# Patient Record
Sex: MALE | Race: WHITE | Employment: FULL TIME | URBAN - METROPOLITAN AREA
[De-identification: names, ages, dates, MRNs, and addresses within clinical notes are randomized per-mention and may not be internally consistent; named-entity substitution may affect disease eponyms.]

---

## 2021-01-23 ENCOUNTER — HOSPITAL ENCOUNTER (EMERGENCY)
Age: 56
Discharge: HOME | End: 2021-01-23
Payer: COMMERCIAL

## 2021-01-23 VITALS
OXYGEN SATURATION: 97 % | DIASTOLIC BLOOD PRESSURE: 83 MMHG | TEMPERATURE: 97.8 F | HEART RATE: 83 BPM | SYSTOLIC BLOOD PRESSURE: 171 MMHG | RESPIRATION RATE: 14 BRPM

## 2021-01-23 DIAGNOSIS — S61.412A LACERATION OF LEFT PALM, INITIAL ENCOUNTER: Primary | ICD-10-CM

## 2021-01-23 PROCEDURE — 99283 EMERGENCY DEPT VISIT LOW MDM: CPT

## 2021-01-23 PROCEDURE — 12001 RPR S/N/AX/GEN/TRNK 2.5CM/<: CPT

## 2021-01-23 PROCEDURE — 90715 TDAP VACCINE 7 YRS/> IM: CPT | Performed by: PHYSICIAN ASSISTANT

## 2021-01-23 PROCEDURE — 90471 IMMUNIZATION ADMIN: CPT | Performed by: PHYSICIAN ASSISTANT

## 2021-01-23 PROCEDURE — 6360000002 HC RX W HCPCS: Performed by: PHYSICIAN ASSISTANT

## 2021-01-23 RX ADMIN — TETANUS TOXOID, REDUCED DIPHTHERIA TOXOID AND ACELLULAR PERTUSSIS VACCINE, ADSORBED 0.5 ML: 5; 2.5; 8; 8; 2.5 SUSPENSION INTRAMUSCULAR at 15:01

## 2021-01-23 ASSESSMENT — PAIN DESCRIPTION - FREQUENCY: FREQUENCY: CONTINUOUS

## 2021-01-23 ASSESSMENT — PAIN DESCRIPTION - PAIN TYPE: TYPE: ACUTE PAIN

## 2021-01-23 ASSESSMENT — PAIN SCALES - GENERAL: PAINLEVEL_OUTOF10: 2

## 2022-06-06 ENCOUNTER — HOSPITAL ENCOUNTER (OUTPATIENT)
Age: 57
Discharge: HOME | End: 2022-06-08
Payer: OTHER GOVERNMENT

## 2022-06-06 ENCOUNTER — HOSPITAL ENCOUNTER (OUTPATIENT)
Dept: GENERAL RADIOLOGY | Age: 57
Discharge: HOME | End: 2022-06-08
Payer: OTHER GOVERNMENT

## 2022-06-06 DIAGNOSIS — M19.90 ARTHRITIS: ICD-10-CM

## 2022-06-06 PROCEDURE — 73562 X-RAY EXAM OF KNEE 3: CPT

## 2022-06-06 PROCEDURE — 73501 X-RAY EXAM HIP UNI 1 VIEW: CPT

## 2022-06-06 PROCEDURE — 73030 X-RAY EXAM OF SHOULDER: CPT

## 2022-06-06 PROCEDURE — 73620 X-RAY EXAM OF FOOT: CPT

## 2022-06-06 PROCEDURE — 72050 X-RAY EXAM NECK SPINE 4/5VWS: CPT

## 2023-11-27 ENCOUNTER — APPOINTMENT (OUTPATIENT)
Dept: GENERAL RADIOLOGY | Age: 58
DRG: 516 | End: 2023-11-27
Payer: COMMERCIAL

## 2023-11-27 ENCOUNTER — APPOINTMENT (OUTPATIENT)
Dept: CT IMAGING | Age: 58
DRG: 516 | End: 2023-11-27
Payer: COMMERCIAL

## 2023-11-27 ENCOUNTER — HOSPITAL ENCOUNTER (INPATIENT)
Age: 58
LOS: 3 days | Discharge: HOME HEALTH | DRG: 516 | End: 2023-11-30
Attending: EMERGENCY MEDICINE | Admitting: SURGERY
Payer: COMMERCIAL

## 2023-11-27 ENCOUNTER — ANESTHESIA EVENT (OUTPATIENT)
Dept: OPERATING ROOM | Age: 58
DRG: 516 | End: 2023-11-27
Payer: COMMERCIAL

## 2023-11-27 DIAGNOSIS — S72.001A CLOSED FRACTURE DISLOCATION OF RIGHT HIP JOINT, INITIAL ENCOUNTER (HCC): Primary | ICD-10-CM

## 2023-11-27 DIAGNOSIS — S32.401A CLOSED DISPLACED FRACTURE OF RIGHT ACETABULUM, UNSPECIFIED PORTION OF ACETABULUM, INITIAL ENCOUNTER (HCC): ICD-10-CM

## 2023-11-27 PROBLEM — T14.90XA TRAUMA: Status: ACTIVE | Noted: 2023-11-27

## 2023-11-27 LAB
ALBUMIN SERPL-MCNC: 4.1 G/DL (ref 3.5–5.2)
ALBUMIN SERPL-MCNC: 4.2 G/DL (ref 3.5–5.2)
ALP SERPL-CCNC: 59 U/L (ref 40–129)
ALP SERPL-CCNC: 66 U/L (ref 40–129)
ALT SERPL-CCNC: 76 U/L (ref 0–40)
ALT SERPL-CCNC: 91 U/L (ref 0–40)
AMPHET UR QL SCN: NEGATIVE
ANION GAP SERPL CALCULATED.3IONS-SCNC: 18 MMOL/L (ref 7–16)
ANION GAP SERPL CALCULATED.3IONS-SCNC: 21 MMOL/L (ref 7–16)
AST SERPL-CCNC: 74 U/L (ref 0–39)
AST SERPL-CCNC: 95 U/L (ref 0–39)
BARBITURATES UR QL SCN: NEGATIVE
BASOPHILS # BLD: 0.07 K/UL (ref 0–0.2)
BASOPHILS NFR BLD: 1 % (ref 0–2)
BENZODIAZ UR QL: POSITIVE
BILIRUB SERPL-MCNC: 0.6 MG/DL (ref 0–1.2)
BILIRUB SERPL-MCNC: 1 MG/DL (ref 0–1.2)
BUN SERPL-MCNC: 21 MG/DL (ref 6–20)
BUN SERPL-MCNC: 24 MG/DL (ref 6–20)
BUPRENORPHINE UR QL: NEGATIVE
CALCIUM SERPL-MCNC: 8.4 MG/DL (ref 8.6–10.2)
CALCIUM SERPL-MCNC: 9.3 MG/DL (ref 8.6–10.2)
CANNABINOIDS UR QL SCN: NEGATIVE
CHLORIDE SERPL-SCNC: 104 MMOL/L (ref 98–107)
CHLORIDE SERPL-SCNC: 99 MMOL/L (ref 98–107)
CO2 SERPL-SCNC: 16 MMOL/L (ref 22–29)
CO2 SERPL-SCNC: 16 MMOL/L (ref 22–29)
COCAINE UR QL SCN: NEGATIVE
CREAT SERPL-MCNC: 0.7 MG/DL (ref 0.7–1.2)
CREAT SERPL-MCNC: 0.8 MG/DL (ref 0.7–1.2)
EOSINOPHIL # BLD: 0 K/UL (ref 0.05–0.5)
EOSINOPHILS RELATIVE PERCENT: 0 % (ref 0–6)
ERYTHROCYTE [DISTWIDTH] IN BLOOD BY AUTOMATED COUNT: 13.2 % (ref 11.5–15)
ERYTHROCYTE [DISTWIDTH] IN BLOOD BY AUTOMATED COUNT: 13.6 % (ref 11.5–15)
FENTANYL UR QL: POSITIVE
GFR SERPL CREATININE-BSD FRML MDRD: >60 ML/MIN/1.73M2
GFR SERPL CREATININE-BSD FRML MDRD: >60 ML/MIN/1.73M2
GLUCOSE BLD-MCNC: 271 MG/DL (ref 74–99)
GLUCOSE SERPL-MCNC: 268 MG/DL (ref 74–99)
GLUCOSE SERPL-MCNC: 378 MG/DL (ref 74–99)
HBA1C MFR BLD: 8.5 % (ref 4–5.6)
HCT VFR BLD AUTO: 40.1 % (ref 37–54)
HCT VFR BLD AUTO: 44.9 % (ref 37–54)
HGB BLD-MCNC: 13.8 G/DL (ref 12.5–16.5)
HGB BLD-MCNC: 15.1 G/DL (ref 12.5–16.5)
INR PPP: 1.1
LACTATE BLDV-SCNC: 2.2 MMOL/L (ref 0.5–2.2)
LYMPHOCYTES NFR BLD: 0.07 K/UL (ref 1.5–4)
LYMPHOCYTES RELATIVE PERCENT: 1 % (ref 20–42)
MCH RBC QN AUTO: 29.7 PG (ref 26–35)
MCH RBC QN AUTO: 30.6 PG (ref 26–35)
MCHC RBC AUTO-ENTMCNC: 33.6 G/DL (ref 32–34.5)
MCHC RBC AUTO-ENTMCNC: 34.4 G/DL (ref 32–34.5)
MCV RBC AUTO: 88.4 FL (ref 80–99.9)
MCV RBC AUTO: 88.9 FL (ref 80–99.9)
METHADONE UR QL: NEGATIVE
MONOCYTES NFR BLD: 0.55 K/UL (ref 0.1–0.95)
MONOCYTES NFR BLD: 7 % (ref 2–12)
NEUTROPHILS NFR BLD: 91 % (ref 43–80)
NEUTS SEG NFR BLD: 7.02 K/UL (ref 1.8–7.3)
OPIATES UR QL SCN: NEGATIVE
OXYCODONE UR QL SCN: NEGATIVE
PARTIAL THROMBOPLASTIN TIME: 26.2 SEC (ref 24.5–35.1)
PCP UR QL SCN: NEGATIVE
PLATELET # BLD AUTO: 117 K/UL (ref 130–450)
PLATELET, FLUORESCENCE: 100 K/UL (ref 130–450)
PMV BLD AUTO: 12.9 FL (ref 7–12)
PMV BLD AUTO: 13 FL (ref 7–12)
POTASSIUM SERPL-SCNC: 4.6 MMOL/L (ref 3.5–5)
POTASSIUM SERPL-SCNC: 5.1 MMOL/L (ref 3.5–5)
PROT SERPL-MCNC: 6.4 G/DL (ref 6.4–8.3)
PROT SERPL-MCNC: 6.9 G/DL (ref 6.4–8.3)
PROTHROMBIN TIME: 12.3 SEC (ref 9.3–12.4)
RBC # BLD AUTO: 4.51 M/UL (ref 3.8–5.8)
RBC # BLD AUTO: 5.08 M/UL (ref 3.8–5.8)
RBC # BLD: ABNORMAL 10*6/UL
RBC # BLD: ABNORMAL 10*6/UL
SODIUM SERPL-SCNC: 136 MMOL/L (ref 132–146)
SODIUM SERPL-SCNC: 138 MMOL/L (ref 132–146)
TEST INFORMATION: ABNORMAL
WBC OTHER # BLD: 7.7 K/UL (ref 4.5–11.5)
WBC OTHER # BLD: 7.8 K/UL (ref 4.5–11.5)

## 2023-11-27 PROCEDURE — 83605 ASSAY OF LACTIC ACID: CPT

## 2023-11-27 PROCEDURE — 73502 X-RAY EXAM HIP UNI 2-3 VIEWS: CPT

## 2023-11-27 PROCEDURE — 6370000000 HC RX 637 (ALT 250 FOR IP)

## 2023-11-27 PROCEDURE — 73560 X-RAY EXAM OF KNEE 1 OR 2: CPT

## 2023-11-27 PROCEDURE — 99285 EMERGENCY DEPT VISIT HI MDM: CPT

## 2023-11-27 PROCEDURE — 6360000002 HC RX W HCPCS: Performed by: EMERGENCY MEDICINE

## 2023-11-27 PROCEDURE — 85027 COMPLETE CBC AUTOMATED: CPT

## 2023-11-27 PROCEDURE — 83036 HEMOGLOBIN GLYCOSYLATED A1C: CPT

## 2023-11-27 PROCEDURE — 2W5LX0Z REMOVAL OF TRACTION APPARATUS ON RIGHT LOWER EXTREMITY: ICD-10-PCS | Performed by: SURGERY

## 2023-11-27 PROCEDURE — 74177 CT ABD & PELVIS W/CONTRAST: CPT

## 2023-11-27 PROCEDURE — 80053 COMPREHEN METABOLIC PANEL: CPT

## 2023-11-27 PROCEDURE — 51702 INSERT TEMP BLADDER CATH: CPT

## 2023-11-27 PROCEDURE — 86900 BLOOD TYPING SEROLOGIC ABO: CPT

## 2023-11-27 PROCEDURE — 2580000003 HC RX 258: Performed by: EMERGENCY MEDICINE

## 2023-11-27 PROCEDURE — 96375 TX/PRO/DX INJ NEW DRUG ADDON: CPT

## 2023-11-27 PROCEDURE — 73610 X-RAY EXAM OF ANKLE: CPT

## 2023-11-27 PROCEDURE — 72190 X-RAY EXAM OF PELVIS: CPT

## 2023-11-27 PROCEDURE — 86923 COMPATIBILITY TEST ELECTRIC: CPT

## 2023-11-27 PROCEDURE — 96374 THER/PROPH/DIAG INJ IV PUSH: CPT

## 2023-11-27 PROCEDURE — 93005 ELECTROCARDIOGRAM TRACING: CPT

## 2023-11-27 PROCEDURE — 99221 1ST HOSP IP/OBS SF/LOW 40: CPT | Performed by: ORTHOPAEDIC SURGERY

## 2023-11-27 PROCEDURE — 6360000004 HC RX CONTRAST MEDICATION: Performed by: RADIOLOGY

## 2023-11-27 PROCEDURE — 1200000000 HC SEMI PRIVATE

## 2023-11-27 PROCEDURE — 85730 THROMBOPLASTIN TIME PARTIAL: CPT

## 2023-11-27 PROCEDURE — 71045 X-RAY EXAM CHEST 1 VIEW: CPT

## 2023-11-27 PROCEDURE — 0QH434Z INSERTION OF INTERNAL FIXATION DEVICE INTO RIGHT ACETABULUM, PERCUTANEOUS APPROACH: ICD-10-PCS | Performed by: SURGERY

## 2023-11-27 PROCEDURE — 6360000002 HC RX W HCPCS

## 2023-11-27 PROCEDURE — 86901 BLOOD TYPING SEROLOGIC RH(D): CPT

## 2023-11-27 PROCEDURE — 82962 GLUCOSE BLOOD TEST: CPT

## 2023-11-27 PROCEDURE — 85610 PROTHROMBIN TIME: CPT

## 2023-11-27 PROCEDURE — 71260 CT THORAX DX C+: CPT

## 2023-11-27 PROCEDURE — 70450 CT HEAD/BRAIN W/O DYE: CPT

## 2023-11-27 PROCEDURE — 85025 COMPLETE CBC W/AUTO DIFF WBC: CPT

## 2023-11-27 PROCEDURE — 72125 CT NECK SPINE W/O DYE: CPT

## 2023-11-27 PROCEDURE — 86850 RBC ANTIBODY SCREEN: CPT

## 2023-11-27 PROCEDURE — 76376 3D RENDER W/INTRP POSTPROCES: CPT

## 2023-11-27 PROCEDURE — 80307 DRUG TEST PRSMV CHEM ANLYZR: CPT

## 2023-11-27 RX ORDER — MIDAZOLAM HYDROCHLORIDE 2 MG/2ML
1 INJECTION, SOLUTION INTRAMUSCULAR; INTRAVENOUS ONCE
Status: COMPLETED | OUTPATIENT
Start: 2023-11-27 | End: 2023-11-27

## 2023-11-27 RX ORDER — FENTANYL CITRATE 50 UG/ML
50 INJECTION, SOLUTION INTRAMUSCULAR; INTRAVENOUS ONCE
Status: COMPLETED | OUTPATIENT
Start: 2023-11-27 | End: 2023-11-27

## 2023-11-27 RX ORDER — POLYETHYLENE GLYCOL 3350 17 G/17G
17 POWDER, FOR SOLUTION ORAL DAILY
Status: DISCONTINUED | OUTPATIENT
Start: 2023-11-27 | End: 2023-11-30 | Stop reason: HOSPADM

## 2023-11-27 RX ORDER — ONDANSETRON 2 MG/ML
4 INJECTION INTRAMUSCULAR; INTRAVENOUS EVERY 6 HOURS PRN
Status: DISCONTINUED | OUTPATIENT
Start: 2023-11-27 | End: 2023-11-30 | Stop reason: HOSPADM

## 2023-11-27 RX ORDER — TIRZEPATIDE 10 MG/.5ML
10 INJECTION, SOLUTION SUBCUTANEOUS WEEKLY
COMMUNITY

## 2023-11-27 RX ORDER — ATORVASTATIN CALCIUM 10 MG/1
10 TABLET, FILM COATED ORAL DAILY
Status: DISCONTINUED | OUTPATIENT
Start: 2023-11-27 | End: 2023-11-27

## 2023-11-27 RX ORDER — INSULIN LISPRO 100 [IU]/ML
0-4 INJECTION, SOLUTION INTRAVENOUS; SUBCUTANEOUS NIGHTLY
Status: DISCONTINUED | OUTPATIENT
Start: 2023-11-27 | End: 2023-11-29

## 2023-11-27 RX ORDER — SODIUM CHLORIDE 9 MG/ML
INJECTION, SOLUTION INTRAVENOUS PRN
Status: DISCONTINUED | OUTPATIENT
Start: 2023-11-27 | End: 2023-11-30 | Stop reason: HOSPADM

## 2023-11-27 RX ORDER — INSULIN GLARGINE 100 [IU]/ML
72 INJECTION, SOLUTION SUBCUTANEOUS 2 TIMES DAILY
Status: DISCONTINUED | OUTPATIENT
Start: 2023-11-27 | End: 2023-11-29

## 2023-11-27 RX ORDER — OXYCODONE HYDROCHLORIDE 5 MG/1
5 TABLET ORAL EVERY 4 HOURS PRN
Status: DISCONTINUED | OUTPATIENT
Start: 2023-11-27 | End: 2023-11-30 | Stop reason: HOSPADM

## 2023-11-27 RX ORDER — INSULIN LISPRO 100 [IU]/ML
40 INJECTION, SOLUTION INTRAVENOUS; SUBCUTANEOUS 2 TIMES DAILY
Status: DISCONTINUED | OUTPATIENT
Start: 2023-11-27 | End: 2023-11-29

## 2023-11-27 RX ORDER — METHOCARBAMOL 500 MG/1
1000 TABLET, FILM COATED ORAL 4 TIMES DAILY
Status: DISCONTINUED | OUTPATIENT
Start: 2023-11-27 | End: 2023-11-30 | Stop reason: HOSPADM

## 2023-11-27 RX ORDER — ONDANSETRON 4 MG/1
4 TABLET, ORALLY DISINTEGRATING ORAL EVERY 8 HOURS PRN
Status: DISCONTINUED | OUTPATIENT
Start: 2023-11-27 | End: 2023-11-30 | Stop reason: HOSPADM

## 2023-11-27 RX ORDER — SODIUM CHLORIDE 0.9 % (FLUSH) 0.9 %
10 SYRINGE (ML) INJECTION EVERY 12 HOURS SCHEDULED
Status: DISCONTINUED | OUTPATIENT
Start: 2023-11-27 | End: 2023-11-30 | Stop reason: HOSPADM

## 2023-11-27 RX ORDER — CANAGLIFLOZIN 300 MG/1
300 TABLET, FILM COATED ORAL
COMMUNITY

## 2023-11-27 RX ORDER — ATORVASTATIN CALCIUM 10 MG/1
10 TABLET, FILM COATED ORAL NIGHTLY
Status: DISCONTINUED | OUTPATIENT
Start: 2023-11-28 | End: 2023-11-30 | Stop reason: HOSPADM

## 2023-11-27 RX ORDER — ACETAMINOPHEN 325 MG/1
650 TABLET ORAL EVERY 4 HOURS PRN
Status: DISCONTINUED | OUTPATIENT
Start: 2023-11-27 | End: 2023-11-30 | Stop reason: HOSPADM

## 2023-11-27 RX ORDER — DEXTROSE MONOHYDRATE 100 MG/ML
INJECTION, SOLUTION INTRAVENOUS CONTINUOUS PRN
Status: DISCONTINUED | OUTPATIENT
Start: 2023-11-27 | End: 2023-11-30 | Stop reason: HOSPADM

## 2023-11-27 RX ORDER — 0.9 % SODIUM CHLORIDE 0.9 %
1000 INTRAVENOUS SOLUTION INTRAVENOUS ONCE
Status: COMPLETED | OUTPATIENT
Start: 2023-11-27 | End: 2023-11-27

## 2023-11-27 RX ORDER — SODIUM CHLORIDE 0.9 % (FLUSH) 0.9 %
10 SYRINGE (ML) INJECTION PRN
Status: DISCONTINUED | OUTPATIENT
Start: 2023-11-27 | End: 2023-11-30 | Stop reason: HOSPADM

## 2023-11-27 RX ORDER — INSULIN LISPRO 100 [IU]/ML
0-4 INJECTION, SOLUTION INTRAVENOUS; SUBCUTANEOUS
Status: DISCONTINUED | OUTPATIENT
Start: 2023-11-27 | End: 2023-11-29

## 2023-11-27 RX ORDER — SENNA AND DOCUSATE SODIUM 50; 8.6 MG/1; MG/1
1 TABLET, FILM COATED ORAL 2 TIMES DAILY
Status: DISCONTINUED | OUTPATIENT
Start: 2023-11-27 | End: 2023-11-30 | Stop reason: HOSPADM

## 2023-11-27 RX ORDER — SODIUM CHLORIDE 9 MG/ML
INJECTION, SOLUTION INTRAVENOUS CONTINUOUS
Status: DISCONTINUED | OUTPATIENT
Start: 2023-11-27 | End: 2023-11-29

## 2023-11-27 RX ORDER — OXYCODONE HYDROCHLORIDE 10 MG/1
10 TABLET ORAL EVERY 4 HOURS PRN
Status: DISCONTINUED | OUTPATIENT
Start: 2023-11-27 | End: 2023-11-30 | Stop reason: HOSPADM

## 2023-11-27 RX ORDER — SODIUM CHLORIDE 0.9 % (FLUSH) 0.9 %
10 SYRINGE (ML) INJECTION
Status: ACTIVE | OUTPATIENT
Start: 2023-11-27 | End: 2023-11-28

## 2023-11-27 RX ADMIN — MIDAZOLAM 1 MG: 1 INJECTION INTRAMUSCULAR; INTRAVENOUS at 12:12

## 2023-11-27 RX ADMIN — SENNOSIDES AND DOCUSATE SODIUM 1 TABLET: 8.6; 5 TABLET ORAL at 21:47

## 2023-11-27 RX ADMIN — FENTANYL CITRATE 50 MCG: 50 INJECTION INTRAMUSCULAR; INTRAVENOUS at 15:23

## 2023-11-27 RX ADMIN — OXYCODONE HYDROCHLORIDE 10 MG: 10 TABLET ORAL at 20:09

## 2023-11-27 RX ADMIN — IOPAMIDOL 75 ML: 755 INJECTION, SOLUTION INTRAVENOUS at 10:33

## 2023-11-27 RX ADMIN — HYDROMORPHONE HYDROCHLORIDE 0.5 MG: 1 INJECTION, SOLUTION INTRAMUSCULAR; INTRAVENOUS; SUBCUTANEOUS at 21:46

## 2023-11-27 RX ADMIN — SODIUM CHLORIDE 1000 ML: 9 INJECTION, SOLUTION INTRAVENOUS at 11:54

## 2023-11-27 RX ADMIN — FENTANYL CITRATE 50 MCG: 50 INJECTION INTRAMUSCULAR; INTRAVENOUS at 12:15

## 2023-11-27 RX ADMIN — SODIUM CHLORIDE: 9 INJECTION, SOLUTION INTRAVENOUS at 11:55

## 2023-11-27 RX ADMIN — METHOCARBAMOL 1000 MG: 500 TABLET ORAL at 20:09

## 2023-11-27 RX ADMIN — INSULIN GLARGINE 72 UNITS: 100 INJECTION, SOLUTION SUBCUTANEOUS at 20:16

## 2023-11-27 ASSESSMENT — PAIN SCALES - GENERAL
PAINLEVEL_OUTOF10: 8
PAINLEVEL_OUTOF10: 5
PAINLEVEL_OUTOF10: 8
PAINLEVEL_OUTOF10: 3
PAINLEVEL_OUTOF10: 7
PAINLEVEL_OUTOF10: 5

## 2023-11-27 ASSESSMENT — PAIN DESCRIPTION - LOCATION
LOCATION: HIP

## 2023-11-27 ASSESSMENT — PAIN - FUNCTIONAL ASSESSMENT
PAIN_FUNCTIONAL_ASSESSMENT: PREVENTS OR INTERFERES WITH ALL ACTIVE AND SOME PASSIVE ACTIVITIES
PAIN_FUNCTIONAL_ASSESSMENT: 0-10

## 2023-11-27 ASSESSMENT — PAIN DESCRIPTION - DESCRIPTORS
DESCRIPTORS: ACHING;STABBING
DESCRIPTORS: STABBING

## 2023-11-27 ASSESSMENT — PAIN DESCRIPTION - ONSET: ONSET: SUDDEN

## 2023-11-27 ASSESSMENT — PAIN DESCRIPTION - ORIENTATION
ORIENTATION: RIGHT

## 2023-11-27 ASSESSMENT — LIFESTYLE VARIABLES
HOW OFTEN DO YOU HAVE A DRINK CONTAINING ALCOHOL: MONTHLY OR LESS
HOW MANY STANDARD DRINKS CONTAINING ALCOHOL DO YOU HAVE ON A TYPICAL DAY: 1 OR 2

## 2023-11-27 ASSESSMENT — PAIN DESCRIPTION - FREQUENCY: FREQUENCY: CONTINUOUS

## 2023-11-27 NOTE — PROGRESS NOTES
X-ray of the right ankle demonstrate no obvious acute fracture dislocation.   Patient does have degenerative changes throughout the ankle

## 2023-11-27 NOTE — ED NOTES
Name: Nini Roman  : 1965  MRN: [id number]    Date: 2023    Benefits of immediately proceeding with Radiology exam outweigh the risks and therefore the following is being waived:      [] Pregnancy test    [] Protocol for Iodine allergy    [] MRI questionnaire    [x] BUN/Creatinine        MD Adrian Joseph MD  23 1011

## 2023-11-27 NOTE — PROGRESS NOTES
Progress note    Spoke with Dr. Kelby Rascon     Patient will undergo open reduction internal fixation of the right pelvis 11-28.   Patient okay for diet from orthopedic surgery standpoint today n.p.o. midnight

## 2023-11-27 NOTE — H&P
TRAUMA HISTORY & PHYSICAL  Attending/Surgical Resident/Advance Practice Nurse  11/27/2023  4:12 PM    PRIMARY SURVEY    CHIEF COMPLAINT:  Trauma consult. Injury occurred just prior to arrival.  Patient was on a tree stand and fell 10 feet. Patient fell on his right hip. He denies hitting his head. Denies any hitting his back. He denies any loss of consciousness. He denies any current back pain, numbness or tingling. Patient brought to the ED and underwent CT head, CT cervical, CT abdomen pelvis and CT chest.  This did not show any injuries other than a right acetabular fracture and a right femur fracture with subluxation. AIRWAY:   Airway Normal    EMS ETT Absent  Noisy respirations Absent  Retractions: Absent  Vomiting/bleeding: Absent    BREATHING:    Midaxillary breath sound left:  Normal  Midaxillary breath sound right:  Normal    Cough sound intensity:  good    .      CIRCULATION:   Femerol pulse intensity: Strong  Palpebral conjunctiva: white    Vitals:    11/27/23 1504   BP: 120/84   Pulse: 88   Resp: 15   Temp:    SpO2: 98%       Vitals:    11/27/23 1217 11/27/23 1335 11/27/23 1412 11/27/23 1504   BP: 137/88 120/85 124/82 120/84   Pulse: 76 88 89 88   Resp: 20 18 20 15   Temp:       SpO2: 98% 98% 97% 98%        FAST EXAM: Deferred    Central Nervous System    GCS Initial 15 minutes   Eye  Motor  Verbal 4 - Opens eyes on own  6 - Follows simple motor commands  5 - Alert and oriented 4 - Opens eyes on own  6 - Follows simple motor commands  5 - Alert and oriented     Neuromuscular blockade: No  Pupil size:  Left 2 mm    Right 2 mm  Pupil reaction: No    Wiggles fingers: Left Yes Right Yes  Wiggles toes: Left Yes   Right Yes    Hand grasp:   Left  Present      Right  Present    Loss of consciousness:  No     History Obtained From:  Patient & EMS  Private Medical Doctor: KEISHA Lott - KELSEY      Pre-exisiting Medical History:  yes    Conditions: DMII    Medications: metforming, lantus, 11/27       Plan of Treatment:  Tert   Ortho consulted - traction pin in place, planning definitive management 11/29   Admit to med surg   Will consult endo for DM management   Ok to restart liptior   Ok for regular diet   NPO on 11/29   Start DVT prophalyxis 11/28   PT/OT  DC planning     Plan discussed with Dr. Kali Medina at 11/27/2023 on 4:12 PM    Electronically signed by Gerardo Ba DO on 11/27/2023 at 4:12 PM\      Attending Attestation   I saw and examined the patient, I agree with resident note      Hx taken from patient    I personally reviewed the  CT imaging, he has an acute R sided acetabular fx dislocation.      - s/p fall from tree stand with acute closed Acetabular fx     I am managing prescription drugs, IV dilaudid, home meds,     Neri Be MD FACS

## 2023-11-28 ENCOUNTER — APPOINTMENT (OUTPATIENT)
Dept: GENERAL RADIOLOGY | Age: 58
DRG: 516 | End: 2023-11-28
Payer: COMMERCIAL

## 2023-11-28 ENCOUNTER — ANESTHESIA (OUTPATIENT)
Dept: OPERATING ROOM | Age: 58
DRG: 516 | End: 2023-11-28
Payer: COMMERCIAL

## 2023-11-28 ENCOUNTER — APPOINTMENT (OUTPATIENT)
Dept: CT IMAGING | Age: 58
DRG: 516 | End: 2023-11-28
Payer: COMMERCIAL

## 2023-11-28 LAB
ANION GAP SERPL CALCULATED.3IONS-SCNC: 18 MMOL/L (ref 7–16)
BASOPHILS # BLD: 0.01 K/UL (ref 0–0.2)
BASOPHILS NFR BLD: 0 % (ref 0–2)
BUN BLD-MCNC: 20 MG/DL (ref 6–20)
BUN SERPL-MCNC: 19 MG/DL (ref 6–20)
CA-I BLD-SCNC: 1.14 MMOL/L (ref 1.15–1.33)
CALCIUM SERPL-MCNC: 8.1 MG/DL (ref 8.6–10.2)
CHLORIDE BLD-SCNC: 104 MMOL/L (ref 100–108)
CHLORIDE SERPL-SCNC: 104 MMOL/L (ref 98–107)
CO2 BLD CALC-SCNC: 21 MMOL/L (ref 22–29)
CO2 SERPL-SCNC: 17 MMOL/L (ref 22–29)
CREAT BLD-MCNC: 0.7 MG/DL (ref 0.7–1.2)
CREAT SERPL-MCNC: 0.7 MG/DL (ref 0.7–1.2)
EGFR, POC: >60 ML/MIN/1.73M2
EKG ATRIAL RATE: 93 BPM
EKG P AXIS: 30 DEGREES
EKG P-R INTERVAL: 158 MS
EKG Q-T INTERVAL: 392 MS
EKG QRS DURATION: 72 MS
EKG QTC CALCULATION (BAZETT): 487 MS
EKG R AXIS: 0 DEGREES
EKG T AXIS: 64 DEGREES
EKG VENTRICULAR RATE: 93 BPM
EOSINOPHIL # BLD: 0.03 K/UL (ref 0.05–0.5)
EOSINOPHILS RELATIVE PERCENT: 1 % (ref 0–6)
ERYTHROCYTE [DISTWIDTH] IN BLOOD BY AUTOMATED COUNT: 13.8 % (ref 11.5–15)
ERYTHROCYTE [DISTWIDTH] IN BLOOD BY AUTOMATED COUNT: 13.8 % (ref 11.5–15)
GFR SERPL CREATININE-BSD FRML MDRD: >60 ML/MIN/1.73M2
GLUCOSE BLD-MCNC: 196 MG/DL (ref 74–99)
GLUCOSE BLD-MCNC: 241 MG/DL (ref 74–99)
GLUCOSE BLD-MCNC: 266 MG/DL (ref 74–99)
GLUCOSE BLD-MCNC: 271 MG/DL (ref 74–99)
GLUCOSE SERPL-MCNC: 226 MG/DL (ref 74–99)
HCO3 VENOUS: 21.2 MMOL/L (ref 22–26)
HCT VFR BLD AUTO: 32 % (ref 37–54)
HCT VFR BLD AUTO: 34.2 % (ref 37–54)
HCT VFR BLD AUTO: 34.5 % (ref 37–54)
HCT VFR BLD AUTO: 37 % (ref 37–54)
HGB BLD-MCNC: 10.8 G/DL (ref 12.5–16.5)
HGB BLD-MCNC: 11.1 G/DL (ref 12.5–16.5)
HGB BLD-MCNC: 12.1 G/DL (ref 12.5–16.5)
IMM GRANULOCYTES # BLD AUTO: 0.03 K/UL (ref 0–0.58)
IMM GRANULOCYTES NFR BLD: 1 % (ref 0–5)
INR PPP: 1.2
LYMPHOCYTES NFR BLD: 0.75 K/UL (ref 1.5–4)
LYMPHOCYTES RELATIVE PERCENT: 11 % (ref 20–42)
MCH RBC QN AUTO: 29.7 PG (ref 26–35)
MCH RBC QN AUTO: 29.8 PG (ref 26–35)
MCHC RBC AUTO-ENTMCNC: 32.5 G/DL (ref 32–34.5)
MCHC RBC AUTO-ENTMCNC: 32.7 G/DL (ref 32–34.5)
MCV RBC AUTO: 90.7 FL (ref 80–99.9)
MCV RBC AUTO: 91.7 FL (ref 80–99.9)
MONOCYTES NFR BLD: 0.94 K/UL (ref 0.1–0.95)
MONOCYTES NFR BLD: 14 % (ref 2–12)
NEGATIVE BASE EXCESS, VEN: 5.2 MMOL/L
NEUTROPHILS NFR BLD: 73 % (ref 43–80)
NEUTS SEG NFR BLD: 4.87 K/UL (ref 1.8–7.3)
O2 SAT, VEN: 86.2 %
PARTIAL THROMBOPLASTIN TIME: 27.3 SEC (ref 24.5–35.1)
PCO2, VEN: 43.8 MM HG
PH VENOUS: 7.29 (ref 7.35–7.45)
PLATELET # BLD AUTO: 108 K/UL (ref 130–450)
PLATELET, FLUORESCENCE: 100 K/UL (ref 130–450)
PMV BLD AUTO: 12.9 FL (ref 7–12)
PMV BLD AUTO: 13 FL (ref 7–12)
PO2, VEN: 57.6 MM HG
POC ANION GAP: 13 MMOL/L (ref 7–16)
POC HEMOGLOBIN (CALC): 10.8 G/DL (ref 12.5–15.5)
POC LACTIC ACID: 1.3 MMOL/L (ref 0.5–2.2)
POTASSIUM BLD-SCNC: 4.2 MMOL/L (ref 3.5–5)
POTASSIUM SERPL-SCNC: 4.3 MMOL/L (ref 3.5–5)
PROTHROMBIN TIME: 12.6 SEC (ref 9.3–12.4)
RBC # BLD AUTO: 3.73 M/UL (ref 3.8–5.8)
RBC # BLD AUTO: 4.08 M/UL (ref 3.8–5.8)
SODIUM BLD-SCNC: 138 MMOL/L (ref 132–146)
SODIUM SERPL-SCNC: 139 MMOL/L (ref 132–146)
WBC OTHER # BLD: 6.5 K/UL (ref 4.5–11.5)
WBC OTHER # BLD: 6.6 K/UL (ref 4.5–11.5)

## 2023-11-28 PROCEDURE — 85014 HEMATOCRIT: CPT

## 2023-11-28 PROCEDURE — 99223 1ST HOSP IP/OBS HIGH 75: CPT | Performed by: SURGERY

## 2023-11-28 PROCEDURE — C1713 ANCHOR/SCREW BN/BN,TIS/BN: HCPCS | Performed by: ORTHOPAEDIC SURGERY

## 2023-11-28 PROCEDURE — 27228 TREAT HIP FRACTURE(S): CPT | Performed by: ORTHOPAEDIC SURGERY

## 2023-11-28 PROCEDURE — 85027 COMPLETE CBC AUTOMATED: CPT

## 2023-11-28 PROCEDURE — 3700000000 HC ANESTHESIA ATTENDED CARE: Performed by: ORTHOPAEDIC SURGERY

## 2023-11-28 PROCEDURE — 6370000000 HC RX 637 (ALT 250 FOR IP): Performed by: ORTHOPAEDIC SURGERY

## 2023-11-28 PROCEDURE — 85610 PROTHROMBIN TIME: CPT

## 2023-11-28 PROCEDURE — 72192 CT PELVIS W/O DYE: CPT

## 2023-11-28 PROCEDURE — 1200000000 HC SEMI PRIVATE

## 2023-11-28 PROCEDURE — 82962 GLUCOSE BLOOD TEST: CPT

## 2023-11-28 PROCEDURE — 2500000003 HC RX 250 WO HCPCS: Performed by: ORTHOPAEDIC SURGERY

## 2023-11-28 PROCEDURE — 6360000002 HC RX W HCPCS: Performed by: ORTHOPAEDIC SURGERY

## 2023-11-28 PROCEDURE — 36415 COLL VENOUS BLD VENIPUNCTURE: CPT

## 2023-11-28 PROCEDURE — 6370000000 HC RX 637 (ALT 250 FOR IP)

## 2023-11-28 PROCEDURE — 6360000002 HC RX W HCPCS

## 2023-11-28 PROCEDURE — 93010 ELECTROCARDIOGRAM REPORT: CPT | Performed by: INTERNAL MEDICINE

## 2023-11-28 PROCEDURE — P9073 PLATELETS PHERESIS PATH REDU: HCPCS

## 2023-11-28 PROCEDURE — 2580000003 HC RX 258

## 2023-11-28 PROCEDURE — 3600000015 HC SURGERY LEVEL 5 ADDTL 15MIN: Performed by: ORTHOPAEDIC SURGERY

## 2023-11-28 PROCEDURE — 2500000003 HC RX 250 WO HCPCS

## 2023-11-28 PROCEDURE — 27228 TREAT HIP FRACTURE(S): CPT | Performed by: STUDENT IN AN ORGANIZED HEALTH CARE EDUCATION/TRAINING PROGRAM

## 2023-11-28 PROCEDURE — 7100000000 HC PACU RECOVERY - FIRST 15 MIN: Performed by: ORTHOPAEDIC SURGERY

## 2023-11-28 PROCEDURE — 2580000003 HC RX 258: Performed by: EMERGENCY MEDICINE

## 2023-11-28 PROCEDURE — 2709999900 HC NON-CHARGEABLE SUPPLY: Performed by: ORTHOPAEDIC SURGERY

## 2023-11-28 PROCEDURE — 2500000003 HC RX 250 WO HCPCS: Performed by: STUDENT IN AN ORGANIZED HEALTH CARE EDUCATION/TRAINING PROGRAM

## 2023-11-28 PROCEDURE — 80047 BASIC METABLC PNL IONIZED CA: CPT

## 2023-11-28 PROCEDURE — 6360000002 HC RX W HCPCS: Performed by: NURSE ANESTHETIST, CERTIFIED REGISTERED

## 2023-11-28 PROCEDURE — 3700000001 HC ADD 15 MINUTES (ANESTHESIA): Performed by: ORTHOPAEDIC SURGERY

## 2023-11-28 PROCEDURE — 0QS404Z REPOSITION RIGHT ACETABULUM WITH INTERNAL FIXATION DEVICE, OPEN APPROACH: ICD-10-PCS | Performed by: ORTHOPAEDIC SURGERY

## 2023-11-28 PROCEDURE — 2580000003 HC RX 258: Performed by: STUDENT IN AN ORGANIZED HEALTH CARE EDUCATION/TRAINING PROGRAM

## 2023-11-28 PROCEDURE — 85018 HEMOGLOBIN: CPT

## 2023-11-28 PROCEDURE — 72170 X-RAY EXAM OF PELVIS: CPT

## 2023-11-28 PROCEDURE — 2720000010 HC SURG SUPPLY STERILE: Performed by: ORTHOPAEDIC SURGERY

## 2023-11-28 PROCEDURE — 85025 COMPLETE CBC W/AUTO DIFF WBC: CPT

## 2023-11-28 PROCEDURE — 85730 THROMBOPLASTIN TIME PARTIAL: CPT

## 2023-11-28 PROCEDURE — 7100000001 HC PACU RECOVERY - ADDTL 15 MIN: Performed by: ORTHOPAEDIC SURGERY

## 2023-11-28 PROCEDURE — 80048 BASIC METABOLIC PNL TOTAL CA: CPT

## 2023-11-28 PROCEDURE — 83605 ASSAY OF LACTIC ACID: CPT

## 2023-11-28 PROCEDURE — 82803 BLOOD GASES ANY COMBINATION: CPT

## 2023-11-28 PROCEDURE — 3600000005 HC SURGERY LEVEL 5 BASE: Performed by: ORTHOPAEDIC SURGERY

## 2023-11-28 PROCEDURE — 6360000002 HC RX W HCPCS: Performed by: STUDENT IN AN ORGANIZED HEALTH CARE EDUCATION/TRAINING PROGRAM

## 2023-11-28 DEVICE — SCREW BNE L55MM DIA3.5MM STD CORT S STL ST NONCANNULATED: Type: IMPLANTABLE DEVICE | Site: PELVIS | Status: FUNCTIONAL

## 2023-11-28 DEVICE — SCREW BNE L36MM DIA3.5MM CORT S STL ST NONCANNULATED LOK: Type: IMPLANTABLE DEVICE | Site: PELVIS | Status: FUNCTIONAL

## 2023-11-28 DEVICE — SCREW BNE L50MM DIA3.5MM STD CORT S STL ST NONCANNULATED: Type: IMPLANTABLE DEVICE | Site: PELVIS | Status: FUNCTIONAL

## 2023-11-28 DEVICE — SCREW BNE L40MM DIA3.5MM STD CORT S STL ST NONCANNULATED: Type: IMPLANTABLE DEVICE | Site: PELVIS | Status: FUNCTIONAL

## 2023-11-28 DEVICE — SCREW BNE L42MM DIA3.5MM CORT S STL ST NONCANNULATED LOK: Type: IMPLANTABLE DEVICE | Site: PELVIS | Status: FUNCTIONAL

## 2023-11-28 DEVICE — IMPLANTABLE DEVICE: Type: IMPLANTABLE DEVICE | Site: PELVIS | Status: FUNCTIONAL

## 2023-11-28 DEVICE — SCREW BNE L60MM DIA4MM CORT S STL ST NONCANNULATED: Type: IMPLANTABLE DEVICE | Site: PELVIS | Status: FUNCTIONAL

## 2023-11-28 DEVICE — SCREW BNE L34MM DIA3.5MM CORT S STL ST NONCANNULATED LOK: Type: IMPLANTABLE DEVICE | Site: PELVIS | Status: FUNCTIONAL

## 2023-11-28 DEVICE — PLATE BNE W102XL104MM THK27MM RAD 108MM 8 H BILAT PELV S: Type: IMPLANTABLE DEVICE | Site: PELVIS | Status: FUNCTIONAL

## 2023-11-28 DEVICE — PLATE BNE L435MM 3 H ST BILAT S STL SPR LO PROF RIG: Type: IMPLANTABLE DEVICE | Site: PELVIS | Status: FUNCTIONAL

## 2023-11-28 RX ORDER — TOBRAMYCIN 1.2 G/30ML
INJECTION, POWDER, LYOPHILIZED, FOR SOLUTION INTRAVENOUS PRN
Status: DISCONTINUED | OUTPATIENT
Start: 2023-11-28 | End: 2023-11-28 | Stop reason: ALTCHOICE

## 2023-11-28 RX ORDER — PROPOFOL 10 MG/ML
INJECTION, EMULSION INTRAVENOUS PRN
Status: DISCONTINUED | OUTPATIENT
Start: 2023-11-28 | End: 2023-11-28 | Stop reason: SDUPTHER

## 2023-11-28 RX ORDER — SODIUM CHLORIDE, SODIUM LACTATE, POTASSIUM CHLORIDE, CALCIUM CHLORIDE 600; 310; 30; 20 MG/100ML; MG/100ML; MG/100ML; MG/100ML
INJECTION, SOLUTION INTRAVENOUS CONTINUOUS PRN
Status: DISCONTINUED | OUTPATIENT
Start: 2023-11-28 | End: 2023-11-28 | Stop reason: SDUPTHER

## 2023-11-28 RX ORDER — MEPERIDINE HYDROCHLORIDE 25 MG/ML
12.5 INJECTION INTRAMUSCULAR; INTRAVENOUS; SUBCUTANEOUS EVERY 5 MIN PRN
Status: DISCONTINUED | OUTPATIENT
Start: 2023-11-28 | End: 2023-11-28 | Stop reason: HOSPADM

## 2023-11-28 RX ORDER — SODIUM CHLORIDE 0.9 % (FLUSH) 0.9 %
5-40 SYRINGE (ML) INJECTION EVERY 12 HOURS SCHEDULED
Status: DISCONTINUED | OUTPATIENT
Start: 2023-11-28 | End: 2023-11-30 | Stop reason: HOSPADM

## 2023-11-28 RX ORDER — HYDROMORPHONE HYDROCHLORIDE 1 MG/ML
0.5 INJECTION, SOLUTION INTRAMUSCULAR; INTRAVENOUS; SUBCUTANEOUS EVERY 5 MIN PRN
Status: DISCONTINUED | OUTPATIENT
Start: 2023-11-28 | End: 2023-11-28 | Stop reason: HOSPADM

## 2023-11-28 RX ORDER — SODIUM CHLORIDE 0.9 % (FLUSH) 0.9 %
5-40 SYRINGE (ML) INJECTION PRN
Status: DISCONTINUED | OUTPATIENT
Start: 2023-11-28 | End: 2023-11-28 | Stop reason: HOSPADM

## 2023-11-28 RX ORDER — HYDROMORPHONE HYDROCHLORIDE 2 MG/ML
INJECTION, SOLUTION INTRAMUSCULAR; INTRAVENOUS; SUBCUTANEOUS PRN
Status: DISCONTINUED | OUTPATIENT
Start: 2023-11-28 | End: 2023-11-28 | Stop reason: SDUPTHER

## 2023-11-28 RX ORDER — SODIUM CHLORIDE 9 MG/ML
INJECTION, SOLUTION INTRAVENOUS PRN
Status: DISCONTINUED | OUTPATIENT
Start: 2023-11-28 | End: 2023-11-30 | Stop reason: HOSPADM

## 2023-11-28 RX ORDER — SODIUM CHLORIDE 0.9 % (FLUSH) 0.9 %
5-40 SYRINGE (ML) INJECTION EVERY 12 HOURS SCHEDULED
Status: DISCONTINUED | OUTPATIENT
Start: 2023-11-28 | End: 2023-11-28 | Stop reason: HOSPADM

## 2023-11-28 RX ORDER — LIDOCAINE HYDROCHLORIDE 20 MG/ML
INJECTION, SOLUTION INTRAVENOUS PRN
Status: DISCONTINUED | OUTPATIENT
Start: 2023-11-28 | End: 2023-11-28 | Stop reason: SDUPTHER

## 2023-11-28 RX ORDER — OXYCODONE HYDROCHLORIDE AND ACETAMINOPHEN 5; 325 MG/1; MG/1
1 TABLET ORAL EVERY 6 HOURS PRN
Qty: 28 TABLET | Refills: 0 | Status: SHIPPED | OUTPATIENT
Start: 2023-11-28 | End: 2023-12-01 | Stop reason: SDUPTHER

## 2023-11-28 RX ORDER — ONDANSETRON 2 MG/ML
INJECTION INTRAMUSCULAR; INTRAVENOUS PRN
Status: DISCONTINUED | OUTPATIENT
Start: 2023-11-28 | End: 2023-11-28 | Stop reason: SDUPTHER

## 2023-11-28 RX ORDER — VANCOMYCIN HYDROCHLORIDE 1 G/20ML
INJECTION, POWDER, LYOPHILIZED, FOR SOLUTION INTRAVENOUS PRN
Status: DISCONTINUED | OUTPATIENT
Start: 2023-11-28 | End: 2023-11-28 | Stop reason: ALTCHOICE

## 2023-11-28 RX ORDER — SODIUM CHLORIDE 9 MG/ML
INJECTION, SOLUTION INTRAVENOUS PRN
Status: DISCONTINUED | OUTPATIENT
Start: 2023-11-28 | End: 2023-11-28 | Stop reason: HOSPADM

## 2023-11-28 RX ORDER — CEFAZOLIN SODIUM 1 G/3ML
INJECTION, POWDER, FOR SOLUTION INTRAMUSCULAR; INTRAVENOUS PRN
Status: DISCONTINUED | OUTPATIENT
Start: 2023-11-28 | End: 2023-11-28 | Stop reason: SDUPTHER

## 2023-11-28 RX ORDER — ROCURONIUM BROMIDE 10 MG/ML
INJECTION, SOLUTION INTRAVENOUS PRN
Status: DISCONTINUED | OUTPATIENT
Start: 2023-11-28 | End: 2023-11-28 | Stop reason: SDUPTHER

## 2023-11-28 RX ORDER — SODIUM CHLORIDE 0.9 % (FLUSH) 0.9 %
5-40 SYRINGE (ML) INJECTION PRN
Status: DISCONTINUED | OUTPATIENT
Start: 2023-11-28 | End: 2023-11-30 | Stop reason: HOSPADM

## 2023-11-28 RX ORDER — ENOXAPARIN SODIUM 100 MG/ML
30 INJECTION SUBCUTANEOUS 2 TIMES DAILY
Qty: 18 ML | Refills: 0 | Status: SHIPPED | OUTPATIENT
Start: 2023-11-28 | End: 2023-12-01 | Stop reason: SDUPTHER

## 2023-11-28 RX ORDER — FENTANYL CITRATE 50 UG/ML
INJECTION, SOLUTION INTRAMUSCULAR; INTRAVENOUS PRN
Status: DISCONTINUED | OUTPATIENT
Start: 2023-11-28 | End: 2023-11-28 | Stop reason: SDUPTHER

## 2023-11-28 RX ORDER — MIDAZOLAM HYDROCHLORIDE 1 MG/ML
INJECTION INTRAMUSCULAR; INTRAVENOUS PRN
Status: DISCONTINUED | OUTPATIENT
Start: 2023-11-28 | End: 2023-11-28 | Stop reason: SDUPTHER

## 2023-11-28 RX ADMIN — HYDROMORPHONE HYDROCHLORIDE 0.5 MG: 1 INJECTION, SOLUTION INTRAMUSCULAR; INTRAVENOUS; SUBCUTANEOUS at 22:45

## 2023-11-28 RX ADMIN — ROCURONIUM BROMIDE 10 MG: 10 INJECTION, SOLUTION INTRAVENOUS at 14:15

## 2023-11-28 RX ADMIN — HYDROMORPHONE HYDROCHLORIDE 1 MG: 2 INJECTION, SOLUTION INTRAMUSCULAR; INTRAVENOUS; SUBCUTANEOUS at 15:29

## 2023-11-28 RX ADMIN — HYDROMORPHONE HYDROCHLORIDE 0.5 MG: 1 INJECTION, SOLUTION INTRAMUSCULAR; INTRAVENOUS; SUBCUTANEOUS at 08:49

## 2023-11-28 RX ADMIN — INSULIN GLARGINE 72 UNITS: 100 INJECTION, SOLUTION SUBCUTANEOUS at 21:40

## 2023-11-28 RX ADMIN — FENTANYL CITRATE 100 MCG: 0.05 INJECTION, SOLUTION INTRAMUSCULAR; INTRAVENOUS at 13:13

## 2023-11-28 RX ADMIN — CEFAZOLIN 3 G: 1 INJECTION, POWDER, FOR SOLUTION INTRAMUSCULAR; INTRAVENOUS at 12:43

## 2023-11-28 RX ADMIN — SODIUM CHLORIDE, POTASSIUM CHLORIDE, SODIUM LACTATE AND CALCIUM CHLORIDE: 600; 310; 30; 20 INJECTION, SOLUTION INTRAVENOUS at 12:19

## 2023-11-28 RX ADMIN — SUGAMMADEX 200 MG: 100 INJECTION, SOLUTION INTRAVENOUS at 15:52

## 2023-11-28 RX ADMIN — INSULIN LISPRO 40 UNITS: 100 INJECTION, SOLUTION INTRAVENOUS; SUBCUTANEOUS at 21:49

## 2023-11-28 RX ADMIN — METHOCARBAMOL 1000 MG: 500 TABLET ORAL at 21:38

## 2023-11-28 RX ADMIN — HYDROMORPHONE HYDROCHLORIDE 0.5 MG: 1 INJECTION, SOLUTION INTRAMUSCULAR; INTRAVENOUS; SUBCUTANEOUS at 01:44

## 2023-11-28 RX ADMIN — INSULIN GLARGINE 72 UNITS: 100 INJECTION, SOLUTION SUBCUTANEOUS at 08:48

## 2023-11-28 RX ADMIN — CEFAZOLIN 3000 MG: 10 INJECTION, POWDER, FOR SOLUTION INTRAVENOUS at 22:53

## 2023-11-28 RX ADMIN — ROCURONIUM BROMIDE 25 MG: 10 INJECTION, SOLUTION INTRAVENOUS at 13:17

## 2023-11-28 RX ADMIN — LIDOCAINE HYDROCHLORIDE 100 MG: 20 INJECTION, SOLUTION INTRAVENOUS at 12:22

## 2023-11-28 RX ADMIN — METHOCARBAMOL 1000 MG: 500 TABLET ORAL at 08:48

## 2023-11-28 RX ADMIN — ROCURONIUM BROMIDE 50 MG: 10 INJECTION, SOLUTION INTRAVENOUS at 12:22

## 2023-11-28 RX ADMIN — HYDROMORPHONE HYDROCHLORIDE 1 MG: 2 INJECTION, SOLUTION INTRAMUSCULAR; INTRAVENOUS; SUBCUTANEOUS at 14:17

## 2023-11-28 RX ADMIN — FENTANYL CITRATE 100 MCG: 0.05 INJECTION, SOLUTION INTRAMUSCULAR; INTRAVENOUS at 12:22

## 2023-11-28 RX ADMIN — ATORVASTATIN CALCIUM 10 MG: 10 TABLET, FILM COATED ORAL at 21:38

## 2023-11-28 RX ADMIN — SENNOSIDES AND DOCUSATE SODIUM 1 TABLET: 8.6; 5 TABLET ORAL at 21:38

## 2023-11-28 RX ADMIN — PROPOFOL 120 MG: 10 INJECTION, EMULSION INTRAVENOUS at 12:22

## 2023-11-28 RX ADMIN — SODIUM CHLORIDE, POTASSIUM CHLORIDE, SODIUM LACTATE AND CALCIUM CHLORIDE: 600; 310; 30; 20 INJECTION, SOLUTION INTRAVENOUS at 16:08

## 2023-11-28 RX ADMIN — SODIUM CHLORIDE: 9 INJECTION, SOLUTION INTRAVENOUS at 13:48

## 2023-11-28 RX ADMIN — SODIUM CHLORIDE, PRESERVATIVE FREE 10 ML: 5 INJECTION INTRAVENOUS at 21:44

## 2023-11-28 RX ADMIN — TRANEXAMIC ACID 1000 MG: 100 INJECTION, SOLUTION INTRAVENOUS at 17:44

## 2023-11-28 RX ADMIN — SODIUM CHLORIDE, PRESERVATIVE FREE 10 ML: 5 INJECTION INTRAVENOUS at 08:55

## 2023-11-28 RX ADMIN — MIDAZOLAM 2 MG: 1 INJECTION INTRAMUSCULAR; INTRAVENOUS at 12:19

## 2023-11-28 RX ADMIN — ONDANSETRON 4 MG: 2 INJECTION INTRAMUSCULAR; INTRAVENOUS at 15:57

## 2023-11-28 RX ADMIN — FENTANYL CITRATE 50 MCG: 0.05 INJECTION, SOLUTION INTRAMUSCULAR; INTRAVENOUS at 13:05

## 2023-11-28 RX ADMIN — HYDROMORPHONE HYDROCHLORIDE 0.5 MG: 1 INJECTION, SOLUTION INTRAMUSCULAR; INTRAVENOUS; SUBCUTANEOUS at 05:38

## 2023-11-28 RX ADMIN — TRANEXAMIC ACID 1000 MG: 100 INJECTION, SOLUTION INTRAVENOUS at 13:07

## 2023-11-28 ASSESSMENT — PAIN DESCRIPTION - PAIN TYPE
TYPE: SURGICAL PAIN
TYPE: SURGICAL PAIN

## 2023-11-28 ASSESSMENT — PAIN DESCRIPTION - DESCRIPTORS
DESCRIPTORS: ACHING;SHARP;DISCOMFORT
DESCRIPTORS: THROBBING;ACHING
DESCRIPTORS: SORE;DISCOMFORT;ACHING

## 2023-11-28 ASSESSMENT — PAIN DESCRIPTION - ORIENTATION
ORIENTATION: RIGHT

## 2023-11-28 ASSESSMENT — PAIN SCALES - GENERAL
PAINLEVEL_OUTOF10: 0
PAINLEVEL_OUTOF10: 3
PAINLEVEL_OUTOF10: 8
PAINLEVEL_OUTOF10: 0
PAINLEVEL_OUTOF10: 5

## 2023-11-28 ASSESSMENT — PAIN - FUNCTIONAL ASSESSMENT
PAIN_FUNCTIONAL_ASSESSMENT: PREVENTS OR INTERFERES SOME ACTIVE ACTIVITIES AND ADLS
PAIN_FUNCTIONAL_ASSESSMENT: PREVENTS OR INTERFERES SOME ACTIVE ACTIVITIES AND ADLS

## 2023-11-28 ASSESSMENT — PAIN DESCRIPTION - FREQUENCY
FREQUENCY: INTERMITTENT
FREQUENCY: INTERMITTENT

## 2023-11-28 ASSESSMENT — PAIN DESCRIPTION - LOCATION
LOCATION: HIP
LOCATION: HIP
LOCATION: HIP;ANKLE

## 2023-11-28 ASSESSMENT — PAIN DESCRIPTION - ONSET
ONSET: GRADUAL
ONSET: GRADUAL

## 2023-11-28 ASSESSMENT — LIFESTYLE VARIABLES: SMOKING_STATUS: 0

## 2023-11-28 ASSESSMENT — PAIN SCALES - WONG BAKER: WONGBAKER_NUMERICALRESPONSE: 0

## 2023-11-28 NOTE — PROGRESS NOTES
OCCUPATIONAL THERAPY TREATMENT NOTE    [facility] [facility]      OT BEDSIDE TREATMENT NOTE      Date:2023  Patient Name: Geneva Colorado  MRN: [id number]  : 1965  Room: [facility]     OT order received. Chart has been reviewed. OT evaluation will be on hold due to planned OR 23. Will continue to follow and complete evaluation at later time. Reva Carney, OTR/L   License #  [id number]

## 2023-11-28 NOTE — CARE COORDINATION
Care coordination:  62 yr old male admitted  after fall from tree. Right hip fracture. Patient to undergo ORIF today. PT OT pending evaluations. Met with patient to assess for discharge planning. Lives with wife in ranch home. Wife is retried. Patient was working and independent. He has a 80% service connection but states he will use his medical insurance for this injury. PCP is Dr. Kishor Sim. No DME    If rehab needed he would be interested in [facility]. Referral called to [facility] . She will follow. SW will follow for discharge planning home vs rehab post surgery and therapy evaluations . Pt caregiver states that the pt has been vomiting since 6 am today. Caregiver states that pt hasn't been able to keep anything down since 6 am and has been experiencing weakness. Pt denies fever/chills.

## 2023-11-28 NOTE — PROGRESS NOTES
Physical Therapy    Date: 2023       Patient Name: Naif Braswell  : 1965      MRN: [id number]    PT order received. Chart has been reviewed. PT evaluation will be on hold due to planned OR 23. Will continue to follow and complete evaluation at later time.      Nando Dunne, PT

## 2023-11-28 NOTE — DISCHARGE INSTRUCTIONS
TRAUMA SERVICES DISCHARGE INSTRUCTIONS    Call [phone], option 2, for any questions/concerns    Please follow the instructions checked below:  Please follow-up with your primary care provider. ACTIVITY INSTRUCTIONS  Increase activity as tolerated  No heavy lifting or strenuous activity  Take your incentive spirometer home and use 4-6 times/day   [x]  No driving until cleared by Orthopedic surgery. MEDICATION INSTRUCTIONS  Take medication as prescribed. When taking pain medications, you may experience dizziness or drowsiness. Do not drink alcohol or drive when taking these medications. You may experience constipation while taking pain medication. You may take over the counter stool softeners such as docusate (Colace), sennosides S (Senokot-S), or Miralax. [x]  You may take Ibuprofen (over the counter) as directed for mild pain. --You may take up to 800mg every 8 hours for pain, please take with food or milk.   []  You may take acetaminophen (Tylenol) products. Do NOT take more than 4000mg of Tylenol in 24h. [x]  Do not take any other acetaminophen (Tylenol) products if you are taking Percocet or Norco, as these contain Tylenol. --Do NOT take more than 4000mg of Tylenol in 24h. OPIOID MEDICATION INSTRUCTIONS  Read the medication guide that is included with your prescription. Take your medication exactly as prescribed. Store medication away from children and in a safe place. Do NOT share your medication with others. Do NOT take medication unless it is prescribed for you. Do NOT drink alcohol while taking opioids (I.e., Norco, Percocet, Oxycodone, etc). Discuss with the Trauma Clinic staff if the dose of medication you are taking does not control your pain and any side effects that you may be having.       CALL 911 OR YOUR LOCAL EMERGENCY SERVICE:  --If you take too much medication  --If you have trouble breathing or shortness of breath  --A child has taken this

## 2023-11-28 NOTE — ANESTHESIA POSTPROCEDURE EVALUATION
Department of Anesthesiology  Postprocedure Note    Patient: Roselia Malcolm  MRN: [id number]  YOB: 1965  Date of evaluation: 11/29/2023      Procedure Summary       Date: 11/28/23 Room / Location: [facility] OR 08 / [facility]    Anesthesia Start: 1219 Anesthesia Stop:     Procedure: RIGHT ACETABULUM OPEN REDUCTION INTERNAL FIXATION, SYNTHES (Right) Diagnosis:       Open nondisplaced fracture of right acetabulum, unspecified portion of acetabulum, initial encounter ([address])      (Open nondisplaced fracture of right acetabulum, unspecified portion of acetabulum, initial encounter ([address]) [S32.401B])    Surgeons: Eros Lino MD Responsible Provider:     Anesthesia Type: Not recorded ASA Status: Not recorded            Anesthesia Type: No value filed.     Jennifer Phase I:      Jennifer Phase II:        Anesthesia Post Evaluation    Patient location during evaluation: PACU  Patient participation: complete - patient participated  Level of consciousness: awake  Pain score: 3  Airway patency: patent  Nausea & Vomiting: no nausea and no vomiting  Complications: no  Cardiovascular status: blood pressure returned to baseline  Respiratory status: acceptable  Hydration status: euvolemic

## 2023-11-28 NOTE — OP NOTE
Operative Note      Patient: Morena Mason  YOB: 1965  MRN: [id number]    Date of Procedure: 11/28/2023    Pre-Op Diagnosis Codes:  Closed displaced comminuted right transverse posterior wall acetabular fracture    Post-Op Diagnosis: Same       Procedure(s):  RIGHT ACETABULUM OPEN REDUCTION INTERNAL FIXATION, SYNTHES    Surgeon(s):  Dm Ayala MD Alfonse Houston, DO    Assistant:   Resident: Rosamaria Roger DO; Chester Oliver DO    Anesthesia: General    Estimated Blood Loss (mL): 8573 mL    Complications: Bleeding    Specimens:   * No specimens in log *    Implants:  Implant Name Type Inv. Item Serial No.  Lot No. LRB No. Used Action   SCREW BNE L40MM DIA3. 5MM STD ARIELA S STL ST NONCANNULATED - [id number]  SCREW BNE L40MM DIA3. 5MM STD ARIELA S STL ST NONCANNULATED  DEPUY [employer]-WD  Right 1 Implanted   SCREW BNE L50MM DIA3. 5MM STD ARIELA S STL ST NONCANNULATED - [id number]  SCREW BNE L50MM DIA3. 5MM STD ARIELA S STL ST NONCANNULATED  DEPUY [employer]-WD  Right 3 Implanted   SCREW BNE L55MM DIA3. 5MM STD ARIELA S STL ST NONCANNULATED - [id number]  SCREW BNE L55MM DIA3. 5MM STD ARIELA S STL ST NONCANNULATED  DEPUY [employer]-WD  Right 1 Implanted   SCREW BNE L60MM DIA4MM ARIELA S STL ST NONCANNULATED - [id number]  SCREW BNE L60MM DIA4MM ARIELA S STL ST NONCANNULATED  DEPUY [employer]-WD  Right 1 Implanted   SCREW BNE L34MM DIA3.5MM ARIELA S STL ST NONCANNULATED JENELLE - [id number]  SCREW BNE L34MM DIA3.5MM ARIELA S STL ST NONCANNULATED JENELLE  DEPUY [employer]-WD  Right 2 Implanted   SCREW BNE L36MM DIA3.5MM ARIELA S STL ST NONCANNULATED JENELLE - [id number]  SCREW BNE L36MM DIA3.5MM ARIELA S STL ST NONCANNULATED JENELLE  DEPUY [employer]-WD  Right 1 Implanted   SCREW BNE L42MM DIA3.5MM ARIELA S STL ST NONCANNULATED JENELLE - [id number]  SCREW BNE L42MM DIA3.5MM ARIELA S STL ST NONCANNULATED JENELLE  [employer]-WD  Right 1 Implanted   PLATE BNE [id number] KAR25OU RAD 108MM 8 H BILAT PELV S - [id number]

## 2023-11-29 LAB
ANION GAP SERPL CALCULATED.3IONS-SCNC: 12 MMOL/L (ref 7–16)
ARM BAND NUMBER: NORMAL
BASOPHILS # BLD: 0.01 K/UL (ref 0–0.2)
BASOPHILS NFR BLD: 0 % (ref 0–2)
BLOOD BANK BLOOD PRODUCT EXPIRATION DATE: NORMAL
BLOOD BANK DISPENSE STATUS: NORMAL
BLOOD BANK ISBT PRODUCT BLOOD TYPE: 6200
BLOOD BANK PRODUCT CODE: NORMAL
BLOOD BANK UNIT TYPE AND RH: NORMAL
BPU ID: NORMAL
BUN SERPL-MCNC: 21 MG/DL (ref 6–20)
CALCIUM SERPL-MCNC: 8.2 MG/DL (ref 8.6–10.2)
CHLORIDE SERPL-SCNC: 98 MMOL/L (ref 98–107)
CO2 SERPL-SCNC: 22 MMOL/L (ref 22–29)
COMPONENT: NORMAL
CREAT SERPL-MCNC: 0.7 MG/DL (ref 0.7–1.2)
EOSINOPHIL # BLD: 0.02 K/UL (ref 0.05–0.5)
EOSINOPHILS RELATIVE PERCENT: 0 % (ref 0–6)
ERYTHROCYTE [DISTWIDTH] IN BLOOD BY AUTOMATED COUNT: 14.2 % (ref 11.5–15)
GFR SERPL CREATININE-BSD FRML MDRD: >60 ML/MIN/1.73M2
GLUCOSE BLD-MCNC: 205 MG/DL (ref 74–99)
GLUCOSE BLD-MCNC: 248 MG/DL (ref 74–99)
GLUCOSE BLD-MCNC: 275 MG/DL (ref 74–99)
GLUCOSE BLD-MCNC: 334 MG/DL (ref 74–99)
GLUCOSE SERPL-MCNC: 254 MG/DL (ref 74–99)
HCT VFR BLD AUTO: 25.5 % (ref 37–54)
HCT VFR BLD AUTO: 28.4 % (ref 37–54)
HGB BLD-MCNC: 8.7 G/DL (ref 12.5–16.5)
HGB BLD-MCNC: 9.6 G/DL (ref 12.5–16.5)
IMM GRANULOCYTES # BLD AUTO: 0.03 K/UL (ref 0–0.58)
IMM GRANULOCYTES NFR BLD: 0 % (ref 0–5)
LYMPHOCYTES NFR BLD: 0.9 K/UL (ref 1.5–4)
LYMPHOCYTES RELATIVE PERCENT: 11 % (ref 20–42)
MCH RBC QN AUTO: 29.8 PG (ref 26–35)
MCHC RBC AUTO-ENTMCNC: 33.8 G/DL (ref 32–34.5)
MCV RBC AUTO: 88.2 FL (ref 80–99.9)
MONOCYTES NFR BLD: 1.06 K/UL (ref 0.1–0.95)
MONOCYTES NFR BLD: 13 % (ref 2–12)
NEUTROPHILS NFR BLD: 75 % (ref 43–80)
NEUTS SEG NFR BLD: 5.97 K/UL (ref 1.8–7.3)
PLATELET # BLD AUTO: 140 K/UL (ref 130–450)
PMV BLD AUTO: 12.6 FL (ref 7–12)
POTASSIUM SERPL-SCNC: 4 MMOL/L (ref 3.5–5)
RBC # BLD AUTO: 3.22 M/UL (ref 3.8–5.8)
SODIUM SERPL-SCNC: 132 MMOL/L (ref 132–146)
TRANSFUSION STATUS: NORMAL
UNIT DIVISION: 0
UNIT ISSUE DATE/TIME: NORMAL
WBC OTHER # BLD: 8 K/UL (ref 4.5–11.5)

## 2023-11-29 PROCEDURE — 97165 OT EVAL LOW COMPLEX 30 MIN: CPT

## 2023-11-29 PROCEDURE — 80048 BASIC METABOLIC PNL TOTAL CA: CPT

## 2023-11-29 PROCEDURE — 97161 PT EVAL LOW COMPLEX 20 MIN: CPT

## 2023-11-29 PROCEDURE — 2580000003 HC RX 258: Performed by: STUDENT IN AN ORGANIZED HEALTH CARE EDUCATION/TRAINING PROGRAM

## 2023-11-29 PROCEDURE — 82962 GLUCOSE BLOOD TEST: CPT

## 2023-11-29 PROCEDURE — 6370000000 HC RX 637 (ALT 250 FOR IP)

## 2023-11-29 PROCEDURE — 85025 COMPLETE CBC W/AUTO DIFF WBC: CPT

## 2023-11-29 PROCEDURE — 2580000003 HC RX 258: Performed by: EMERGENCY MEDICINE

## 2023-11-29 PROCEDURE — 97530 THERAPEUTIC ACTIVITIES: CPT

## 2023-11-29 PROCEDURE — 6360000002 HC RX W HCPCS: Performed by: STUDENT IN AN ORGANIZED HEALTH CARE EDUCATION/TRAINING PROGRAM

## 2023-11-29 PROCEDURE — 85018 HEMOGLOBIN: CPT

## 2023-11-29 PROCEDURE — 1200000000 HC SEMI PRIVATE

## 2023-11-29 PROCEDURE — 2580000003 HC RX 258

## 2023-11-29 PROCEDURE — 6360000002 HC RX W HCPCS

## 2023-11-29 PROCEDURE — 99232 SBSQ HOSP IP/OBS MODERATE 35: CPT | Performed by: SURGERY

## 2023-11-29 PROCEDURE — 85014 HEMATOCRIT: CPT

## 2023-11-29 PROCEDURE — 36415 COLL VENOUS BLD VENIPUNCTURE: CPT

## 2023-11-29 PROCEDURE — 6370000000 HC RX 637 (ALT 250 FOR IP): Performed by: INTERNAL MEDICINE

## 2023-11-29 PROCEDURE — 97535 SELF CARE MNGMENT TRAINING: CPT

## 2023-11-29 RX ORDER — INSULIN GLARGINE 100 [IU]/ML
70 INJECTION, SOLUTION SUBCUTANEOUS 2 TIMES DAILY
Status: DISCONTINUED | OUTPATIENT
Start: 2023-11-29 | End: 2023-11-30 | Stop reason: HOSPADM

## 2023-11-29 RX ORDER — INSULIN LISPRO 100 [IU]/ML
0-18 INJECTION, SOLUTION INTRAVENOUS; SUBCUTANEOUS
Status: DISCONTINUED | OUTPATIENT
Start: 2023-11-29 | End: 2023-11-30

## 2023-11-29 RX ORDER — ENOXAPARIN SODIUM 100 MG/ML
30 INJECTION SUBCUTANEOUS 2 TIMES DAILY
Status: DISCONTINUED | OUTPATIENT
Start: 2023-11-29 | End: 2023-11-30 | Stop reason: HOSPADM

## 2023-11-29 RX ORDER — INSULIN LISPRO 100 [IU]/ML
0-18 INJECTION, SOLUTION INTRAVENOUS; SUBCUTANEOUS
Status: DISCONTINUED | OUTPATIENT
Start: 2023-11-29 | End: 2023-11-29

## 2023-11-29 RX ORDER — INSULIN LISPRO 100 [IU]/ML
35 INJECTION, SOLUTION INTRAVENOUS; SUBCUTANEOUS
Status: DISCONTINUED | OUTPATIENT
Start: 2023-11-30 | End: 2023-11-30 | Stop reason: HOSPADM

## 2023-11-29 RX ORDER — INSULIN LISPRO 100 [IU]/ML
30 INJECTION, SOLUTION INTRAVENOUS; SUBCUTANEOUS
Status: DISCONTINUED | OUTPATIENT
Start: 2023-11-29 | End: 2023-11-29

## 2023-11-29 RX ADMIN — INSULIN LISPRO 2 UNITS: 100 INJECTION, SOLUTION INTRAVENOUS; SUBCUTANEOUS at 12:16

## 2023-11-29 RX ADMIN — OXYCODONE HYDROCHLORIDE 10 MG: 10 TABLET ORAL at 04:52

## 2023-11-29 RX ADMIN — HYDROMORPHONE HYDROCHLORIDE 0.5 MG: 1 INJECTION, SOLUTION INTRAMUSCULAR; INTRAVENOUS; SUBCUTANEOUS at 06:25

## 2023-11-29 RX ADMIN — OXYCODONE HYDROCHLORIDE 10 MG: 10 TABLET ORAL at 00:42

## 2023-11-29 RX ADMIN — SENNOSIDES AND DOCUSATE SODIUM 1 TABLET: 8.6; 5 TABLET ORAL at 20:59

## 2023-11-29 RX ADMIN — ATORVASTATIN CALCIUM 10 MG: 10 TABLET, FILM COATED ORAL at 20:59

## 2023-11-29 RX ADMIN — ACETAMINOPHEN 650 MG: 325 TABLET ORAL at 04:51

## 2023-11-29 RX ADMIN — DICLOFENAC SODIUM 2 G: 10 GEL TOPICAL at 08:49

## 2023-11-29 RX ADMIN — HYDROMORPHONE HYDROCHLORIDE 0.5 MG: 1 INJECTION, SOLUTION INTRAMUSCULAR; INTRAVENOUS; SUBCUTANEOUS at 02:45

## 2023-11-29 RX ADMIN — INSULIN LISPRO 30 UNITS: 100 INJECTION, SOLUTION INTRAVENOUS; SUBCUTANEOUS at 16:31

## 2023-11-29 RX ADMIN — INSULIN GLARGINE 70 UNITS: 100 INJECTION, SOLUTION SUBCUTANEOUS at 21:00

## 2023-11-29 RX ADMIN — SODIUM CHLORIDE, PRESERVATIVE FREE 10 ML: 5 INJECTION INTRAVENOUS at 08:51

## 2023-11-29 RX ADMIN — INSULIN LISPRO 6 UNITS: 100 INJECTION, SOLUTION INTRAVENOUS; SUBCUTANEOUS at 21:07

## 2023-11-29 RX ADMIN — DICLOFENAC SODIUM 2 G: 10 GEL TOPICAL at 21:00

## 2023-11-29 RX ADMIN — HYDROMORPHONE HYDROCHLORIDE 0.5 MG: 1 INJECTION, SOLUTION INTRAMUSCULAR; INTRAVENOUS; SUBCUTANEOUS at 18:48

## 2023-11-29 RX ADMIN — INSULIN LISPRO 1 UNITS: 100 INJECTION, SOLUTION INTRAVENOUS; SUBCUTANEOUS at 08:57

## 2023-11-29 RX ADMIN — HYDROMORPHONE HYDROCHLORIDE 0.5 MG: 1 INJECTION, SOLUTION INTRAMUSCULAR; INTRAVENOUS; SUBCUTANEOUS at 10:22

## 2023-11-29 RX ADMIN — OXYCODONE HYDROCHLORIDE 10 MG: 10 TABLET ORAL at 21:07

## 2023-11-29 RX ADMIN — SODIUM CHLORIDE: 9 INJECTION, SOLUTION INTRAVENOUS at 04:56

## 2023-11-29 RX ADMIN — METHOCARBAMOL 1000 MG: 500 TABLET ORAL at 08:51

## 2023-11-29 RX ADMIN — POLYETHYLENE GLYCOL 3350 17 G: 17 POWDER, FOR SOLUTION ORAL at 08:51

## 2023-11-29 RX ADMIN — DICLOFENAC SODIUM 2 G: 10 GEL TOPICAL at 05:08

## 2023-11-29 RX ADMIN — METHOCARBAMOL 1000 MG: 500 TABLET ORAL at 16:31

## 2023-11-29 RX ADMIN — ACETAMINOPHEN 650 MG: 325 TABLET ORAL at 00:42

## 2023-11-29 RX ADMIN — SENNOSIDES AND DOCUSATE SODIUM 1 TABLET: 8.6; 5 TABLET ORAL at 08:51

## 2023-11-29 RX ADMIN — ENOXAPARIN SODIUM 30 MG: 100 INJECTION SUBCUTANEOUS at 09:00

## 2023-11-29 RX ADMIN — INSULIN LISPRO 40 UNITS: 100 INJECTION, SOLUTION INTRAVENOUS; SUBCUTANEOUS at 08:50

## 2023-11-29 RX ADMIN — HYDROMORPHONE HYDROCHLORIDE 0.5 MG: 1 INJECTION, SOLUTION INTRAMUSCULAR; INTRAVENOUS; SUBCUTANEOUS at 14:50

## 2023-11-29 RX ADMIN — INSULIN GLARGINE 72 UNITS: 100 INJECTION, SOLUTION SUBCUTANEOUS at 08:50

## 2023-11-29 RX ADMIN — CEFAZOLIN 3000 MG: 10 INJECTION, POWDER, FOR SOLUTION INTRAVENOUS at 04:57

## 2023-11-29 RX ADMIN — METHOCARBAMOL 1000 MG: 500 TABLET ORAL at 12:46

## 2023-11-29 RX ADMIN — ENOXAPARIN SODIUM 30 MG: 100 INJECTION SUBCUTANEOUS at 20:59

## 2023-11-29 RX ADMIN — METHOCARBAMOL 1000 MG: 500 TABLET ORAL at 20:59

## 2023-11-29 RX ADMIN — SODIUM CHLORIDE, PRESERVATIVE FREE 10 ML: 5 INJECTION INTRAVENOUS at 20:59

## 2023-11-29 RX ADMIN — INSULIN LISPRO 12 UNITS: 100 INJECTION, SOLUTION INTRAVENOUS; SUBCUTANEOUS at 16:32

## 2023-11-29 ASSESSMENT — PAIN - FUNCTIONAL ASSESSMENT
PAIN_FUNCTIONAL_ASSESSMENT: PREVENTS OR INTERFERES SOME ACTIVE ACTIVITIES AND ADLS

## 2023-11-29 ASSESSMENT — PAIN SCALES - GENERAL
PAINLEVEL_OUTOF10: 4
PAINLEVEL_OUTOF10: 0
PAINLEVEL_OUTOF10: 7
PAINLEVEL_OUTOF10: 3
PAINLEVEL_OUTOF10: 7
PAINLEVEL_OUTOF10: 7
PAINLEVEL_OUTOF10: 8
PAINLEVEL_OUTOF10: 6
PAINLEVEL_OUTOF10: 7
PAINLEVEL_OUTOF10: 3
PAINLEVEL_OUTOF10: 7
PAINLEVEL_OUTOF10: 3
PAINLEVEL_OUTOF10: 7

## 2023-11-29 ASSESSMENT — PAIN DESCRIPTION - DESCRIPTORS
DESCRIPTORS: ACHING;DISCOMFORT;SHARP
DESCRIPTORS: ACHING;DISCOMFORT;SORE
DESCRIPTORS: SORE;SHARP;DISCOMFORT
DESCRIPTORS: ACHING;SORE;DISCOMFORT
DESCRIPTORS: SHARP;ACHING
DESCRIPTORS: ACHING;SORE;SHARP
DESCRIPTORS: ACHING;DISCOMFORT;SORE
DESCRIPTORS: SORE;DISCOMFORT;ACHING
DESCRIPTORS: SORE;SHARP;ACHING
DESCRIPTORS: ACHING;DISCOMFORT;SORE

## 2023-11-29 ASSESSMENT — PAIN SCALES - WONG BAKER
WONGBAKER_NUMERICALRESPONSE: 0
WONGBAKER_NUMERICALRESPONSE: 2
WONGBAKER_NUMERICALRESPONSE: 8

## 2023-11-29 ASSESSMENT — PAIN DESCRIPTION - FREQUENCY
FREQUENCY: CONTINUOUS
FREQUENCY: INTERMITTENT
FREQUENCY: CONTINUOUS
FREQUENCY: INTERMITTENT

## 2023-11-29 ASSESSMENT — PAIN DESCRIPTION - ORIENTATION
ORIENTATION: RIGHT
ORIENTATION: RIGHT;OUTER
ORIENTATION: RIGHT

## 2023-11-29 ASSESSMENT — PAIN DESCRIPTION - LOCATION
LOCATION: HIP
LOCATION: ANKLE
LOCATION: HIP
LOCATION: ANKLE;HIP

## 2023-11-29 ASSESSMENT — PAIN DESCRIPTION - ONSET
ONSET: GRADUAL
ONSET: ON-GOING
ONSET: ON-GOING
ONSET: GRADUAL

## 2023-11-29 ASSESSMENT — PAIN DESCRIPTION - PAIN TYPE
TYPE: SURGICAL PAIN
TYPE: SURGICAL PAIN
TYPE: ACUTE PAIN;SURGICAL PAIN
TYPE: ACUTE PAIN;SURGICAL PAIN

## 2023-11-29 NOTE — PROGRESS NOTES
techniques and functional independence  * Recommendation of environmental modifications for increased safety with functional transfers/mobility and ADLs  * Therapeutic activities to facilitate/challenge dynamic balance, stand tolerance for increased safety and independence with ADLs  * Positioning to improve skin integrity, interaction with environment and functional independence    Recommended Adaptive Equipment: ww, shower seat, BSC, AE prn    Home Living: Pt lives with wife in a 1 story home with no steps and no hand rails. Bed and bath on main  floor with laundry in basement- wife to perform.    Family/ Outside assistance available: wife  Bathroom setup: walk in shower and standard commode   Equipment owned: none    Prior Level of Function: Independent with ADLs , Independent with IADLs; ambulated no AD   Driving: yes   Occupation: GM of plant  Medication management: self  Leisure: enjoys reading and hunting    Pain Level: 8/10 R hip and ankle   Cognition: A&O: 4/4; Follows multi step directions   Memory:  good- recall of post. Hip precautions   Sequencing:  good-   Problem solving:  good-   Judgement/safety:  good-     Functional Assessment:  AM-PAC Daily Activity Raw Score: 14/24   Initial Eval Status  Date: 11/29/23 Treatment Status  Date: STGs = LTGs  Time frame: 2-4 days   Feeding Independent     Grooming Setup sitting  Mod I seated   UB Dressing SBA   Mod I    LB Dressing Dependent needs AE   Min A with AE prn   Bathing Simulated max A   Min A seated with LHS   Toileting Dependent catheter needs elevated BSC  Min A to BSC with ww   Bed Mobility  Supine to sit: mod A   Sit to supine:  n/t  Supine to sit: mod I   Sit to supine: mod I    Functional Transfers Sit to stand min A with ww and intermittent assist with R LE   Mod I with ww   Functional Mobility Stand pivot min A with ww  Mod I with ww   Balance Sitting:     Static:  good    Dynamic:SBA  Standing: min A Activity Tolerance Good- O2 RA 93%  Good with ADL completion   Visual/  Perceptual Glasses: yes WFL          Safety Good-                                  Good follow through of posterior hip precautions and NWB  to RLE      Hand Dominance R   AROM (PROM) Strength Additional Info:    RUE  WFL WFL good  and wfl FMC/dexterity noted during ADL tasks       LUE WFL WFL good  and wfl FMC/dexterity noted during ADL tasks     Hearing: WFL   Sensation:   No c/o numbness or tingling   Tone: WFL   Edema: min RLE    Comments: Upon arrival patient supine and agreeable to evaluation. Performed OT evaluation with education on posterior hip precautions and NWB to RLE and safety with ADL completion. Review of recommended DME and AE. Walker and bathroom safety. At end of session, patient sitting up in chair and  with call light and phone within reach, all lines and tubes intact. Nursing notified. Overall patient demonstrated  decreased independence and safety during completion of ADL/functional transfer/mobility tasks. Pt would benefit from continued skilled OT to increase safety and independence with completion of ADL/IADL tasks for functional independence and quality of life.     Treatment: OT treatment provided this date includes:   Instruction/training on safety and adapted techniques for completion of ADLs: to increase [place] in self care within precautions  with AE/DME prn   Functional transfer/mobility training/DME recommendations for increased  independence, safety, and fall prevention with  ww   Instruction/training on energy conservation/work simplification for completion of ADLs: techniques to increase Throck[place]on with self care ADLs & iADLs, work simplification to improve endurance   Proper Positioning/Alignment: for optimal healing, skin integrity to prevent breakdown, decrease edema  Skilled monitoring of vitals: to include BP, spO2 & HR during session  Sitting/standing Balance/Tolerance- to increased balance & activity tolerance during ADLs as well as facilitate proper posture and/or positioning. Rehab Potential: Good  for established goals     Patient / Family Goal: home with assist      Patient and/or family were instructed on functional diagnosis, prognosis/goals and OT plan of care. Demonstrated good understanding. Eval Complexity: low    Time In: 8:50  Time Out: 9:30  Total Treatment Time: 25 min. Min Units   OT Eval Low 06928  X     OT Eval Medium 47703      OT Eval High 55944       OT Re-Eval [id number]       Therapeutic Ex [id number]       Therapeutic Activities 78501  15  1   ADL/Self Care 42705  10  1   Orthotic Management 03387       Neuro Re-Ed 74339       Non-Billable Time          Evaluation Time includes thorough review of current medical information, gathering information on past medical history/social history and prior level of function, completion of standardized testing/informal observation of tasks, assessment of data and education on plan of care and goals. Jose Graves.  [address], [address]

## 2023-11-29 NOTE — PROGRESS NOTES
Physical Therapy  Initial Evaluation      Name: Jennifer Plunkett  : 1965  MRN: [id number]      Date of Service: 2023    Evaluating PT:  Keny Earl PT, DPT  [id number]    Room #:  8875/0972-E  Diagnosis:  Trauma [T14.90XA]  PMHx/PSHx:   has a past medical history of Diabetes mellitus ([address]) and Hyperlipidemia. Procedure/Surgery:  1. Open reduction internal fixation of transverse acetabular fracture  with posterior wall through Kocher-Langenbeck posterior approach. 2.  Removal of traction pin, right leg. 23  Precautions:  NWB RLE, Falls, Acetabular  Equipment Needs:    22\" Manual W/c with elevating leg rests, desk length arm rests, foam cushion  Bariatric [employer]    SUBJECTIVE:    Pt lives with spouse in a 1 story home with no stairs to enter. Bed is on first floor and bath is on first floor. Pt ambulated with no AD independently PTA. Equipment Owned:   None    OBJECTIVE:   Initial Evaluation  Date: 23 Treatment Short Term/ Long Term   Goals   AM-PAC 6 Clicks 80/76  W/c level     Was pt agreeable to Eval/treatment? yes     Does pt have pain? Moderate pain in R hip     Bed Mobility  Rolling: NT  Supine to sit: ModA  Sit to supine: NT   Scooting: ModA  Rolling: Independent    Supine to sit:  Independent    Sit to supine: Independent    Scooting: Independent     Transfers Sit to stand: SBA  Stand to sit: SBA  Stand pivot: SBA [employer]  Sit to stand: Modified Independent  Stand to sit: Modified Independent  Stand pivot: Modified Independent     Ambulation    3 feet with SBA WW   50 feet with Modified Independent   AAD   Stair negotiation: ascended and descended  NT  4 steps with single rail Modified Independent     ROM BUE:  Defer to OT  BLE:  WFL     Strength BUE:  Defer to OT  RLE: 2+/5 hip  2+/5 knee  4/5 ankle  LLE: 5/5  Improve 1 MMT   Balance Sitting EOB:  SBA  Dynamic Standing:  SBA [employer]  Sitting EOB:  Independent    Dynamic Standing:  Modified Independent       Pt is A & O x 4  Sensation:

## 2023-11-29 NOTE — CARE COORDINATION
Care Coordination:  Following for discharge planning. Met with patient times 2, reviewed chart. PT OT evaluations complete. Patient confident he and wife can manage at home and plan is home with home health. Offered choice list, refused with no preference. Referral to [facility] for PT OT. Case accepted and will see patient at home on Friday. Needs wheeled walker for discharge. No DME preference. [facility] not provider for [employer]. Referral to [employer]. They accepted case. They can not deliver today. Will deliver tomorrow or if needed today can  at [place] office. Patient agreeable to this plan.   Continue to follow

## 2023-11-29 NOTE — PROGRESS NOTES
Patient requesting Voltaren gel, or something similar for his right ankle.  Message sent to Dr. Russell Fairbanks via [employer]

## 2023-11-29 NOTE — OP NOTE
[address], [address] [place] [address]                                OPERATIVE REPORT    PATIENT NAME: Theresa Houser                  :        1965  MED REC NO:   [id number]                            ROOM:       5416  ACCOUNT NO:   [de-identified]                           ADMIT DATE: 2023  PROVIDER:     Nisha Helms MD    DATE OF PROCEDURE:  2023    PREOPERATIVE DIAGNOSES:  Right complex acetabular fracture transverse  plus comminuted posterior wall fracture with previous hip dislocation. POSTOPERATIVE DIAGNOSES:  Right complex acetabular fracture transverse  plus comminuted posterior wall fracture with previous hip dislocation. PROCEDURES:  1. Open reduction internal fixation of transverse acetabular fracture  with posterior wall through Kocher-Langenbeck posterior approach. 2.  Removal of traction pin, right leg. SURGEON:  Yamil Hermosillo DO    FIRST ASSISTANT:  Nisha Helms MD    OTHER ASSISTANTS:  Include Dr. Albina Eisenberg, Dr. Monika Salter,  and Dr. Anum Valenzuela, residents. ANESTHESIA:  General.    ESTIMATED BLOOD LOSS:  1200 mL. PREOPERATIVE INDICATION:  This is a 60-year-old male who fell out of a  tree stand yesterday and sustained complex acetabular fracture  dislocation on the right. It was reduced last night and placed in  skeletal traction, but the fragments remained very comminuted at the  posterior wall and displaced. The acetabulum was completely unstable,  out of traction. I was on-call. I also asked Dr. Guzman Matute to assist.   In fact, he was the surgeon and I assisted. We both talked to the  patient about the risks and benefits of open reduction internal  fixation, understanding the complex nature of this injury. The patient  agreed to proceed at this time.     OPERATIVE PROCEDURE:  After a full huddle was performed in the operating  room, the patient had adequate general We reduced the transverse  component and put several K-wires from posterior to anterior, and then  we carefully and meticulously put the pieces back together posteriorly. There was a large lateral fragment which was most to the articular  cartilage. The three other fragments did not have much cartilage on it,  but mostly had cancellous bone and cortical bone. We put the major  fragment back first which was the major cartilage fragment, and then we  pieced the cortical fragments together several times until we were happy  with the reduction. There was comminution. There was not a perfect  reduction, but the articular surface was perfect. We then put three  plates on the back of the pelvis. We put one 5-hole plate medially to  hold the transverse component reduced using a 2.5 drill bit and 3.5  fully-threaded screws. We used an 8-hole posterior column  reconstruction plate from the ischial tuberosity across the posterior  column over the acetabulum posteriorly and over the superior dome. We  did also hook this up to a spring plate to try to get better purchase. This was a 3-hole spring plate. We used several screws proximally over  the acetabulum and single screw into the ischium distally. We had  excellent purchase. We tried to put a lag screw through the fragment at  the joint, but there was no room that the fragment was too peripheral  and would not allow an angle, so we removed that lag screw. Otherwise,  we had an excellent reduction on the AP and on Judet views, there were  no screws in the joint. We copiously irrigated out the area. We did  release the piriformis on approach, and we repaired the piriformis on  closure. We used vancomycin powder over the area. We closed the fascia  with interrupted #1 Vicryl suture. We closed the skin with 2-0 Vicryl  and staples. We did take out the transfixion pin in the distal femur as  well. We did that earlier on in the case.     This patient would need posterior hip precautions. They would need  nonweightbearing for mostly likely three months. The patient tolerated  this procedure well. Dr. David Jiang once again is the primary surgeon, I  am the assistant.         Katiana Olmedo MD    D: 11/28/2023 15:39:04       T: 11/29/2023 1:16:37     [email or web address]  Job#: [id number]     Doc#: [id number]    CC:

## 2023-11-29 NOTE — PROGRESS NOTES
Marks catheter removed 1020 per protocol of after having surgical procedure. 600CC of yellow urine emptied. Tip still intact. Patient stated no complaints of pain/discomfort. Patient instructed to let this nurse know when he has his first void.  Verbal understanding given

## 2023-11-29 NOTE — PROGRESS NOTES
Dr Katlin rosen served to wait on further CT scan because Pia Johnson just returned for PACU and his nurse needs to monitor bp.

## 2023-11-30 VITALS
SYSTOLIC BLOOD PRESSURE: 118 MMHG | RESPIRATION RATE: 18 BRPM | DIASTOLIC BLOOD PRESSURE: 70 MMHG | WEIGHT: 302 LBS | BODY MASS INDEX: 37.75 KG/M2 | TEMPERATURE: 97.5 F | OXYGEN SATURATION: 96 % | HEART RATE: 90 BPM

## 2023-11-30 LAB
ANION GAP SERPL CALCULATED.3IONS-SCNC: 15 MMOL/L (ref 7–16)
BASOPHILS # BLD: 0.01 K/UL (ref 0–0.2)
BASOPHILS NFR BLD: 0 % (ref 0–2)
BUN SERPL-MCNC: 15 MG/DL (ref 6–20)
CALCIUM SERPL-MCNC: 8.1 MG/DL (ref 8.6–10.2)
CHLORIDE SERPL-SCNC: 99 MMOL/L (ref 98–107)
CO2 SERPL-SCNC: 21 MMOL/L (ref 22–29)
CREAT SERPL-MCNC: 0.6 MG/DL (ref 0.7–1.2)
EOSINOPHIL # BLD: 0.02 K/UL (ref 0.05–0.5)
EOSINOPHILS RELATIVE PERCENT: 0 % (ref 0–6)
ERYTHROCYTE [DISTWIDTH] IN BLOOD BY AUTOMATED COUNT: 14.2 % (ref 11.5–15)
GFR SERPL CREATININE-BSD FRML MDRD: >60 ML/MIN/1.73M2
GLUCOSE BLD-MCNC: 196 MG/DL (ref 74–99)
GLUCOSE BLD-MCNC: 201 MG/DL (ref 74–99)
GLUCOSE BLD-MCNC: 219 MG/DL (ref 74–99)
GLUCOSE SERPL-MCNC: 170 MG/DL (ref 74–99)
HCT VFR BLD AUTO: 23.7 % (ref 37–54)
HGB BLD-MCNC: 7.9 G/DL (ref 12.5–16.5)
IMM GRANULOCYTES # BLD AUTO: 0.04 K/UL (ref 0–0.58)
IMM GRANULOCYTES NFR BLD: 1 % (ref 0–5)
LYMPHOCYTES NFR BLD: 0.73 K/UL (ref 1.5–4)
LYMPHOCYTES RELATIVE PERCENT: 13 % (ref 20–42)
MAGNESIUM SERPL-MCNC: 1.9 MG/DL (ref 1.6–2.6)
MCH RBC QN AUTO: 29.3 PG (ref 26–35)
MCHC RBC AUTO-ENTMCNC: 33.3 G/DL (ref 32–34.5)
MCV RBC AUTO: 87.8 FL (ref 80–99.9)
MONOCYTES NFR BLD: 0.84 K/UL (ref 0.1–0.95)
MONOCYTES NFR BLD: 15 % (ref 2–12)
NEUTROPHILS NFR BLD: 71 % (ref 43–80)
NEUTS SEG NFR BLD: 4.03 K/UL (ref 1.8–7.3)
PLATELET # BLD AUTO: 114 K/UL (ref 130–450)
PMV BLD AUTO: 12.3 FL (ref 7–12)
POTASSIUM SERPL-SCNC: 3.4 MMOL/L (ref 3.5–5)
RBC # BLD AUTO: 2.7 M/UL (ref 3.8–5.8)
SODIUM SERPL-SCNC: 135 MMOL/L (ref 132–146)
WBC OTHER # BLD: 5.7 K/UL (ref 4.5–11.5)

## 2023-11-30 PROCEDURE — 36415 COLL VENOUS BLD VENIPUNCTURE: CPT

## 2023-11-30 PROCEDURE — 99232 SBSQ HOSP IP/OBS MODERATE 35: CPT | Performed by: INTERNAL MEDICINE

## 2023-11-30 PROCEDURE — 97110 THERAPEUTIC EXERCISES: CPT

## 2023-11-30 PROCEDURE — 80048 BASIC METABOLIC PNL TOTAL CA: CPT

## 2023-11-30 PROCEDURE — 6360000002 HC RX W HCPCS

## 2023-11-30 PROCEDURE — 6370000000 HC RX 637 (ALT 250 FOR IP)

## 2023-11-30 PROCEDURE — 6370000000 HC RX 637 (ALT 250 FOR IP): Performed by: INTERNAL MEDICINE

## 2023-11-30 PROCEDURE — 82962 GLUCOSE BLOOD TEST: CPT

## 2023-11-30 PROCEDURE — 97535 SELF CARE MNGMENT TRAINING: CPT

## 2023-11-30 PROCEDURE — 99232 SBSQ HOSP IP/OBS MODERATE 35: CPT | Performed by: SURGERY

## 2023-11-30 PROCEDURE — 83735 ASSAY OF MAGNESIUM: CPT

## 2023-11-30 PROCEDURE — 2580000003 HC RX 258: Performed by: STUDENT IN AN ORGANIZED HEALTH CARE EDUCATION/TRAINING PROGRAM

## 2023-11-30 PROCEDURE — 2580000003 HC RX 258

## 2023-11-30 PROCEDURE — 85025 COMPLETE CBC W/AUTO DIFF WBC: CPT

## 2023-11-30 PROCEDURE — 97530 THERAPEUTIC ACTIVITIES: CPT

## 2023-11-30 RX ORDER — SENNA AND DOCUSATE SODIUM 50; 8.6 MG/1; MG/1
1 TABLET, FILM COATED ORAL 2 TIMES DAILY
Qty: 14 TABLET | Refills: 0 | Status: SHIPPED | OUTPATIENT
Start: 2023-11-30 | End: 2023-12-06 | Stop reason: SDUPTHER

## 2023-11-30 RX ORDER — MAGNESIUM SULFATE IN WATER 40 MG/ML
2000 INJECTION, SOLUTION INTRAVENOUS ONCE
Status: COMPLETED | OUTPATIENT
Start: 2023-11-30 | End: 2023-11-30

## 2023-11-30 RX ORDER — INSULIN LISPRO 100 [IU]/ML
0-24 INJECTION, SOLUTION INTRAVENOUS; SUBCUTANEOUS
Status: DISCONTINUED | OUTPATIENT
Start: 2023-11-30 | End: 2023-11-30 | Stop reason: HOSPADM

## 2023-11-30 RX ORDER — METHOCARBAMOL 1000 MG/1
1000 TABLET, COATED ORAL 4 TIMES DAILY
Qty: 28 TABLET | Refills: 0 | Status: SHIPPED | OUTPATIENT
Start: 2023-11-30 | End: 2023-12-07

## 2023-11-30 RX ADMIN — ENOXAPARIN SODIUM 30 MG: 100 INJECTION SUBCUTANEOUS at 08:59

## 2023-11-30 RX ADMIN — POTASSIUM BICARBONATE 40 MEQ: 782 TABLET, EFFERVESCENT ORAL at 09:06

## 2023-11-30 RX ADMIN — HYDROMORPHONE HYDROCHLORIDE 0.5 MG: 1 INJECTION, SOLUTION INTRAMUSCULAR; INTRAVENOUS; SUBCUTANEOUS at 04:40

## 2023-11-30 RX ADMIN — INSULIN LISPRO 35 UNITS: 100 INJECTION, SOLUTION INTRAVENOUS; SUBCUTANEOUS at 08:59

## 2023-11-30 RX ADMIN — POLYETHYLENE GLYCOL 3350 17 G: 17 POWDER, FOR SOLUTION ORAL at 08:59

## 2023-11-30 RX ADMIN — METHOCARBAMOL 1000 MG: 500 TABLET ORAL at 08:59

## 2023-11-30 RX ADMIN — METHOCARBAMOL 1000 MG: 500 TABLET ORAL at 12:38

## 2023-11-30 RX ADMIN — INSULIN LISPRO 6 UNITS: 100 INJECTION, SOLUTION INTRAVENOUS; SUBCUTANEOUS at 12:38

## 2023-11-30 RX ADMIN — SODIUM CHLORIDE, PRESERVATIVE FREE 10 ML: 5 INJECTION INTRAVENOUS at 04:40

## 2023-11-30 RX ADMIN — OXYCODONE HYDROCHLORIDE 10 MG: 10 TABLET ORAL at 05:50

## 2023-11-30 RX ADMIN — DICLOFENAC SODIUM 2 G: 10 GEL TOPICAL at 09:00

## 2023-11-30 RX ADMIN — SODIUM CHLORIDE, PRESERVATIVE FREE 10 ML: 5 INJECTION INTRAVENOUS at 09:01

## 2023-11-30 RX ADMIN — INSULIN GLARGINE 70 UNITS: 100 INJECTION, SOLUTION SUBCUTANEOUS at 09:06

## 2023-11-30 RX ADMIN — SENNOSIDES AND DOCUSATE SODIUM 1 TABLET: 8.6; 5 TABLET ORAL at 08:59

## 2023-11-30 RX ADMIN — ACETAMINOPHEN 650 MG: 325 TABLET ORAL at 05:50

## 2023-11-30 RX ADMIN — INSULIN LISPRO 35 UNITS: 100 INJECTION, SOLUTION INTRAVENOUS; SUBCUTANEOUS at 12:40

## 2023-11-30 RX ADMIN — OXYCODONE HYDROCHLORIDE 10 MG: 10 TABLET ORAL at 12:38

## 2023-11-30 RX ADMIN — MAGNESIUM SULFATE HEPTAHYDRATE 2000 MG: 40 INJECTION, SOLUTION INTRAVENOUS at 10:03

## 2023-11-30 RX ADMIN — INSULIN LISPRO 3 UNITS: 100 INJECTION, SOLUTION INTRAVENOUS; SUBCUTANEOUS at 08:59

## 2023-11-30 RX ADMIN — SODIUM CHLORIDE, PRESERVATIVE FREE 10 ML: 5 INJECTION INTRAVENOUS at 09:00

## 2023-11-30 ASSESSMENT — PAIN SCALES - WONG BAKER
WONGBAKER_NUMERICALRESPONSE: 0
WONGBAKER_NUMERICALRESPONSE: 8
WONGBAKER_NUMERICALRESPONSE: 2
WONGBAKER_NUMERICALRESPONSE: 0

## 2023-11-30 ASSESSMENT — PAIN - FUNCTIONAL ASSESSMENT
PAIN_FUNCTIONAL_ASSESSMENT: PREVENTS OR INTERFERES SOME ACTIVE ACTIVITIES AND ADLS

## 2023-11-30 ASSESSMENT — PAIN SCALES - GENERAL
PAINLEVEL_OUTOF10: 7
PAINLEVEL_OUTOF10: 0
PAINLEVEL_OUTOF10: 7
PAINLEVEL_OUTOF10: 8
PAINLEVEL_OUTOF10: 0
PAINLEVEL_OUTOF10: 2

## 2023-11-30 ASSESSMENT — PAIN DESCRIPTION - DESCRIPTORS
DESCRIPTORS: ACHING;DISCOMFORT;SORE
DESCRIPTORS: ACHING;DISCOMFORT;SORE
DESCRIPTORS: ACHING;SHARP

## 2023-11-30 ASSESSMENT — PAIN DESCRIPTION - ONSET: ONSET: ON-GOING

## 2023-11-30 ASSESSMENT — PAIN DESCRIPTION - FREQUENCY: FREQUENCY: CONTINUOUS

## 2023-11-30 ASSESSMENT — PAIN DESCRIPTION - ORIENTATION
ORIENTATION: RIGHT

## 2023-11-30 ASSESSMENT — PAIN DESCRIPTION - PAIN TYPE: TYPE: SURGICAL PAIN

## 2023-11-30 ASSESSMENT — PAIN DESCRIPTION - LOCATION
LOCATION: ANKLE
LOCATION: HIP;ANKLE
LOCATION: HIP

## 2023-11-30 NOTE — PROGRESS NOTES
Physical Therapy  Treatment Note       Name: Berto Spencer  : 1965  MRN: [id number]      Date of Service: 2023    Evaluating PT:  Guy Collins PT, DPT  [id number]    Room #:  2814/8424-Z  Diagnosis:  Trauma [T14.90XA]  PMHx/PSHx:   has a past medical history of Diabetes mellitus ([address]) and Hyperlipidemia. Procedure/Surgery:  1. Open reduction internal fixation of transverse acetabular fracture  with posterior wall through Kocher-Langenbeck posterior approach. 2.  Removal of traction pin, right leg. 23  Precautions:  NWB RLE, Falls, Acetabular  Equipment Needs:    22\" Manual W/c with elevating leg rests, desk length arm rests, foam cushion   standard walker per pt preference     SUBJECTIVE:    Pt lives with spouse in a 1 story home with no stairs to enter. Bed is on first floor and bath is on first floor. Pt ambulated with no AD independently PTA. Equipment Owned:   None    OBJECTIVE:   Initial Evaluation  Date: 23 Treatment  23 Short Term/ Long Term   Goals   AM-PAC 6 Clicks   W/c level     Was pt agreeable to Eval/treatment? yes Yes     Does pt have pain? Moderate pain in R hip 5/10 R hip pain     Bed Mobility  Rolling: NT  Supine to sit: ModA  Sit to supine: NT   Scooting: ModA Supine to sit min A  Scooting to edge of bed min A  Rolling: Independent    Supine to sit:  Independent    Sit to supine: Independent    Scooting: Independent     Transfers Sit to stand: SBA  Stand to sit: SBA  Stand pivot: SBA [employer] Sit to stand min A  Stand to sit min A   Stand pivot with standard walker  Sit to stand: Modified Independent  Stand to sit: Modified Independent  Stand pivot: Modified Independent     Ambulation    3 feet with SBA WW  10 feet with standard walker with min A  50 feet with Modified Independent   AAD   Stair negotiation: ascended and descended  NT NT 4 steps with single rail Modified Independent     ROM BUE:  Defer to OT  BLE:  WFL     Strength BUE:  Defer to OT  RLE:

## 2023-11-30 NOTE — PROGRESS NOTES
Physician Progress Note      PATIENT:               Ashanti Luna  CSN #:                  [id number]  :                       1965  ADMIT DATE:       2023 9:58 AM  DISCH DATE:  RESPONDING  PROVIDER #:        Kim Sandoval MD          QUERY TEXT:    Pt admitted with displaced acetabular fracture. Noted to have drop in H&H from   15.1 & 44.9 on admission to 7.9 & 23.7 on  In op note on , patient   noted to have almost 500 ml hematoma and superior gluteal vein injury with EBL   of 1200 ml. If possible, please document in the progress notes and discharge   summary if you are evaluating and/or treating any of the following: The medical record reflects the following:  Risk Factors: displaced acetabular fracture with hematoma and superior gluteal   vein injury  Clinical Indicators: H&H on admission was 15.1 & 44.9 and dropped to 7.9 &   23.7 on , Per op note on  \". Jess Luna Estimated blood loss: 1200 ml. ..almost   500 mL hematoma was evacuated from the pelvis. .. this hematoma was most   likely caused by superior gluteal vein injury and when we disimpacted the   hematoma, the vein consistently bled. Jess Luna \"  Treatment: 1 unit of platelets given intraoperatively, serial H&H, type and   cross    Thank you,  Aftab Edward RN, BSN, CDIS  Clinical Documentation Improvement  [email or web address]  Options provided:  -- Acute blood loss anemia  -- Precipitous drop in Hemoglobin and Hematocrit  -- Other - I will add my own diagnosis  -- Disagree - Not applicable / Not valid  -- Disagree - Clinically unable to determine / Unknown  -- Refer to Clinical Documentation Reviewer    PROVIDER RESPONSE TEXT:    This patient has acute blood loss anemia.     Query created by: Aftab Edward on 2023 10:22 AM      Electronically signed by:  Kim Sandoval MD 2023 1:05 PM

## 2023-11-30 NOTE — CARE COORDINATION
11/30/2023Social work transition of care planning  Pt plan remains for home with ZULEMA Sommer. Pt requesting standard walker v fww. Sw followed up with [facility] and axel-unable to assist with heavy duty standard walker. [facility] walker available for private pay($121 with 40% discount with order). Pt agreeable to private pay cost. Will need [facility] orders for PT/OT.   Electronically signed by KULWANT Espino on 11/30/2023 at 11:17 AM

## 2023-11-30 NOTE — CONSULTS
ENDOCRINOLOGY INITIAL CONSULTATION NOTE      Date of admission: 11/27/2023  Date of service: 11/29/2023  Admitting physician: Noris Parker MD   Primary Care Physician: Marylin Spatz, APRN - CNP  Consultant physician: Parish Avila MD     Reason for the consultation:  Uncontrolled DM    History of Present Illness: The history is provided by the patient. Accuracy of the patient data is excellent    Nae Orourke is a very pleasant 62 y.o. old male with PMH of obesity, poorly controlled type 2 diabetes, hyperlipidemia and other listed below admitted to [facility] on 11/27/2023 because of right hip pain following a fall, endocrine service was consulted for diabetes management. The Patient reportedly was climbing up a tree stand, he was about 10 feet up when his glove slipped off his hand, causing him to fall down landing on mostly his right side. X-ray on admission showed Right acetabulum fracture closed    Prior to admission  The patient was diagnosed with diabetes long time ago. Prior to the admission he was on Lantus 72 units twice daily and Humalog 40 units 3 times daily with meals plus the sliding scale. The patient was checking blood sugar ACHS prior to the admission and readings has been highly variable. He denies any hypoglycemic episodes prior to the admission. The patient admits to poor compliance with diabetic diet prior to the admission. He reports mild neuropathies but denies any retinopathy or nephropathy.   The patient is due for his annual eye exam  Lab Results   Component Value Date/Time    LABA1C 8.5 11/27/2023 05:15 PM       Inpatient diet:   Carb Restricted diet     Point of care glucose monitoring   (Independently reviewed)   Recent Labs     11/27/23  1957 11/28/23  0727 11/28/23  1338 11/28/23  1908 11/28/23  2136 11/29/23  0614 11/29/23  1143 11/29/23  1617   POCGLU 271* 241* 196* 266* 271* 205* 275* 334*       Past medical history:   Past Medical History:   Diagnosis Date    Diabetes Elevated right hemidiaphragm. XR HIP 2-3 VW W PELVIS RIGHT   Final Result   Comminuted right acetabular fracture and superior subluxation of the femoral   head. XR PELVIS (MIN 3 VIEWS)   Final Result   Fracture subluxation as described above. CT HEAD WO CONTRAST   Final Result   No acute intracranial abnormality. Specifically, there is no acute   intracranial hemorrhage. CT CERVICAL SPINE WO CONTRAST   Final Result   1. There is no acute compression fracture or subluxation of the cervical   spine. 2. Mild multilevel degenerative disc and degenerative joint disease. [place] CT ABDOMEN PELVIS W IV CONTRAST Additional Contrast? None   Final Result   Chest: No acute traumatic findings of the chest      Abdomen and pelvis: Fracture-dislocation right hip with acetabular fractures   as described above including posterior comminuted and displaced elements   extending through the medial aspect puboacetabular junction with minimal   displacement/cortical step-off. Right femoral head is superolateral from   expected findings consistent of dislocation associated traumatic findings   without acute fracture of the right femur noted. No hematoma despite edema. No intrapelvic traumatic findings. CT CHEST W CONTRAST   Final Result   Chest: No acute traumatic findings of the chest      Abdomen and pelvis: Fracture-dislocation right hip with acetabular fractures   as described above including posterior comminuted and displaced elements   extending through the medial aspect puboacetabular junction with minimal   displacement/cortical step-off. Right femoral head is superolateral from   expected findings consistent of dislocation associated traumatic findings   without acute fracture of the right femur noted. No hematoma despite edema. No intrapelvic traumatic findings.          XR CHEST 1 VIEW    (Results Pending)   XR PELVIS (1-2 VIEWS)    (Results Pending)       Medical Records/Labs/Images review:   I personally reviewed and summarized previous records   All labs and imaging were reviewed independently     [address], a 62 y.o.-old male seen today for inpatient diabetes management    Poorly controlled diabetes type 2  Patient's diabetes is uncontrolled and currently on a high dose of insulin  While inpatient, we recommend the following DM regimen  Lantus 70 units twice daily  Humalog 35 units 3 times daily with meals  High-dose sliding scale ACHS  Continue glucose check with meals and at bedtime  Will titrate insulin dose based on the blood glucose trend & insulin requirement  The patient is going to follow with his endocrinologist shortly after the discharge    Dietary noncompliance  Discussed with patient the importance of eating consistent carbohydrate meals, avoiding high glycemic index food. Also, discussed with patient the risk and negative consequences of dietary noncompliance on blood glucose control, blood pressure and weight    Hyperlipidemia   Lipitor 10 mg daily    Right acetabulum fracture closed following a fall  Management per orthopedic service    Interdisciplinary plan for communication with healthcare providers:   Consult recommendations were discussed with the Primary Service/Nursing staff      The above issues were reviewed with the patient who understood and agreed with the plan. Thank you for allowing us to participate in the care of this patient. Please do not hesitate to contact us with any additional questions. Donita Pressley MD  Endocrinologist, [facility] Diabetes Care and Endocrinology   [address] [place] [address],[address] [address]   Phone: [phone]  Fax: [phone]  --------------------------------------------  An electronic signature was used to authenticate this note.  Katherine Aranda MD on 11/29/2023 at 8:00 PM

## 2023-11-30 NOTE — PROGRESS NOTES
ENDOCRINOLOGY PROGRESS  NOTE      Date of admission: 11/27/2023  Date of service: 11/30/2023  Admitting physician: Dee Craft MD   Primary Care Physician: KEISHA Goddard - CNP  Consultant physician: Shaw Bradford MD     Reason for the consultation:  Uncontrolled DM    History of Present Illness: The history is provided by the patient. Accuracy of the patient data is excellent    Kathryn Kraus is a very pleasant 62 y.o. old male with PMH of obesity, poorly controlled type 2 diabetes, hyperlipidemia and other listed below admitted to [facility] on 11/27/2023 because of right hip pain following a fall, endocrine service was consulted for diabetes management.      Subjective   The patient was seen today, no acute events overnight, glucose level improving but still above goal.    Inpatient diet:   Carb Restricted diet     Point of care glucose monitoring   (Independently reviewed)   Recent Labs     11/28/23  2136 11/29/23  0614 11/29/23  1143 11/29/23  1617 11/29/23  2001 11/30/23  0547 11/30/23  0831 11/30/23  1232   POCGLU 271* 205* 275* 334* 248* 201* 196* 219*     Scheduled Meds:   insulin lispro  0-24 Units SubCUTAneous 4x Daily AC & HS    enoxaparin  30 mg SubCUTAneous BID    insulin glargine  70 Units SubCUTAneous BID    insulin lispro  35 Units SubCUTAneous TID WC    sodium chloride flush  5-40 mL IntraVENous 2 times per day    diclofenac sodium  2 g Topical BID    sodium chloride flush  10 mL IntraVENous 2 times per day    methocarbamol  1,000 mg Oral 4x Daily    polyethylene glycol  17 g Oral Daily    sennosides-docusate sodium  1 tablet Oral BID    atorvastatin  10 mg Oral Nightly       PRN Meds:   sodium chloride, , PRN  sodium chloride, , PRN  sodium chloride flush, 5-40 mL, PRN  sodium chloride, , PRN  sodium chloride flush, 10 mL, PRN  sodium chloride, , PRN  ondansetron, 4 mg, Q8H PRN   Or  ondansetron, 4 mg, Q6H PRN  oxyCODONE, 5 mg, Q4H PRN   Or  oxyCODONE, 10 mg, Q4H PRN  acetaminophen, 650 (2-3 VIEWS)   Final Result   Improved alignment of right hip status post reduction. XR KNEE RIGHT (1-2 VIEWS)   Final Result   1. Small synovial effusion. 2. No evidence of fracture or dislocation. XR CHEST PORTABLE   Final Result   No acute process. Elevated right hemidiaphragm. XR HIP 2-3 VW W PELVIS RIGHT   Final Result   Comminuted right acetabular fracture and superior subluxation of the femoral   head. XR PELVIS (MIN 3 VIEWS)   Final Result   Fracture subluxation as described above. CT HEAD WO CONTRAST   Final Result   No acute intracranial abnormality. Specifically, there is no acute   intracranial hemorrhage. CT CERVICAL SPINE WO CONTRAST   Final Result   1. There is no acute compression fracture or subluxation of the cervical   spine. 2. Mild multilevel degenerative disc and degenerative joint disease. [place] CT ABDOMEN PELVIS W IV CONTRAST Additional Contrast? None   Final Result   Chest: No acute traumatic findings of the chest      Abdomen and pelvis: Fracture-dislocation right hip with acetabular fractures   as described above including posterior comminuted and displaced elements   extending through the medial aspect puboacetabular junction with minimal   displacement/cortical step-off. Right femoral head is superolateral from   expected findings consistent of dislocation associated traumatic findings   without acute fracture of the right femur noted. No hematoma despite edema. No intrapelvic traumatic findings. CT CHEST W CONTRAST   Final Result   Chest: No acute traumatic findings of the chest      Abdomen and pelvis: Fracture-dislocation right hip with acetabular fractures   as described above including posterior comminuted and displaced elements   extending through the medial aspect puboacetabular junction with minimal   displacement/cortical step-off.   Right femoral head is superolateral from   expected findings consistent questions. Slick Martinez MD  Endocrinologist, [facility] Diabetes Care and Endocrinology   [address] [facility] [address]   Phone: [phone]  Fax: [phone]  --------------------------------------------  An electronic signature was used to authenticate this note.  Iesha Oliver MD on 11/30/2023 at 2:23 PM

## 2023-11-30 NOTE — PROGRESS NOTES
Occupational Therapy  OT BEDSIDE TREATMENT NOTE    [facility] [address] [place] [address]   [address] [place] [address]      UZFK:  Patient Name: Nini Roman  MRN: [id number]  : 1965  Room: [address] [place] [address]     Referring Provider: Maxwell Briscoe DO  Specific Provider Orders/Date: OT evaluation and treat 23     Evaluating OT: Jonna Dasilva OTR/L #[id number]     Diagnosis: Trauma [T14.90XA]       Surgery:   Past Surgical History         Past Surgical History:   Procedure Laterality Date    HIP FRACTURE SURGERY Right 2023     RIGHT ACETABULUM OPEN REDUCTION INTERNAL FIXATION, SYNTHES performed by Barbara Malhotra MD at [address]                   Pertinent Medical History:  has a past medical history of Diabetes mellitus ([address]) and Hyperlipidemia.       Precautions:  Fall Risk, NWB RLE , posterior hip precautions     Assessment of current deficits   [x] Functional mobility             [x]ADLs           [] Strength                  []Cognition   [x] Functional transfers           [x] IADLs         [x] Safety Awareness   [x]Endurance   [] Fine Coordination              [x] Balance      [] Vision/perception   []Sensation     []Gross Motor Coordination  [] ROM           [] Delirium                   [] Motor Control      OT PLAN OF CARE   OT POC based on physician orders, patient diagnosis and results of clinical assessment     Frequency/Duration   2-4 days/wk for 1 week PRN   Specific OT Treatment Interventions to include:   * Instruction/training on adapted ADL techniques and AE recommendations to increase functional independence within precautions       * Training on energy conservation strategies, correct breathing pattern and techniques to improve independence/tolerance for self-care routine  * Functional transfer/mobility training/DME recommendations for increased independence, safety, and fall prevention  * Patient/Family education to clothing management  Min A- hygiene  Min A to BSC with ww   Bed Mobility  Supine to sit: mod A   Sit to supine:  n/t  Min A- supine>sit  Educated pt on technique to increase independence. Pt educated on and issued leg . Supine to sit: mod I   Sit to supine: mod I    Functional Transfers Sit to stand min A with ww and intermittent assist with R LE  Min A- sit<->stand  Cuing for hand placement and body mechanics. Educated pt on choosing higher surfaces due to hip precautions. Mod I with ww   Functional Mobility Stand pivot min A with ww Min A  Short home distance using w/w  Mod I with ww   Balance Sitting:     Static:  good    Dynamic:SBA  Standing: min A  Sitting:     Static: Ind    Dynamic: SBA  Standing: min A                                                                        Activity Tolerance Good- O2 RA 93%  Good- Good with ADL completion   Visual/  Perceptual Glasses: yes WFL            Safety Good-  Good-                                 Good follow through of posterior hip precautions and NWB  to RLE         Comments: Upon arrival pt supine in bed. Continued education given on posterior hip precautions. Pt educated on adaptive techniques to increase independence and safety during ADL's, bed mobility, and functional transfers while maintaining precautions. At end of session pt left seated in bedside chair, call light within reach. Pt has made fair progress towards set goals.      Continue with current plan of care    Treatment Time In: 8:50            Treatment Time Out: 9:35             Treatment Charges: Mins Units   Ther Ex  06120     Manual Therapy [address]     Thera Activities 78135 20 1   ADL/Home Mgt 75123 25 2   Neuro Re-ed 25979     Group Therapy      Orthotic manage/training  55405     Non-Billable Time     Total Timed Treatment [address] [facility] Double O-Z Flap Text: The defect edges were debeveled with a #15 scalpel blade.  Given the location of the defect, shape of the defect and the proximity to free margins a Double O-Z flap was deemed most appropriate.  Using a sterile surgical marker, an appropriate transposition flap was drawn incorporating the defect and placing the expected incisions within the relaxed skin tension lines where possible. The area thus outlined was incised deep to adipose tissue with a #15 scalpel blade.  The skin margins were undermined to an appropriate distance in all directions utilizing iris scissors.

## 2023-12-01 ENCOUNTER — TELEPHONE (OUTPATIENT)
Dept: ORTHOPEDIC SURGERY | Age: 58
End: 2023-12-01

## 2023-12-01 DIAGNOSIS — S72.001A CLOSED FRACTURE DISLOCATION OF RIGHT HIP JOINT, INITIAL ENCOUNTER (HCC): ICD-10-CM

## 2023-12-01 LAB
ABO/RH: NORMAL
ANTIBODY SCREEN: NEGATIVE
ARM BAND NUMBER: NORMAL
BLOOD BANK DISPENSE STATUS: NORMAL
BLOOD BANK DISPENSE STATUS: NORMAL
BLOOD BANK SAMPLE EXPIRATION: NORMAL
BPU ID: NORMAL
BPU ID: NORMAL
COMPONENT: NORMAL
COMPONENT: NORMAL
CROSSMATCH RESULT: NORMAL
CROSSMATCH RESULT: NORMAL
TRANSFUSION STATUS: NORMAL
TRANSFUSION STATUS: NORMAL
UNIT DIVISION: 0
UNIT DIVISION: 0

## 2023-12-01 RX ORDER — OXYCODONE HYDROCHLORIDE AND ACETAMINOPHEN 5; 325 MG/1; MG/1
1 TABLET ORAL EVERY 6 HOURS PRN
Qty: 28 TABLET | Refills: 0 | Status: SHIPPED | OUTPATIENT
Start: 2023-12-01 | End: 2023-12-08

## 2023-12-01 RX ORDER — ENOXAPARIN SODIUM 100 MG/ML
40 INJECTION SUBCUTANEOUS DAILY
Qty: 18 ML | Refills: 1 | Status: SHIPPED | OUTPATIENT
Start: 2023-12-01 | End: 2024-01-30

## 2023-12-01 NOTE — TELEPHONE ENCOUNTER
Lovenox and percocet prescribed    Controlled Substance Monitoring:    Acute and Chronic Pain Monitoring:   RX Monitoring Periodic Controlled Substance Monitoring   12/1/2023   1:49 PM No signs of potential drug abuse or diversion identified.

## 2023-12-01 NOTE — PROGRESS NOTES
PCP is KEISHA Valle - CNP  Office notified of admission.       Electronically signed by Tari Cyr RN on 12/1/2023 at 12:06 PM

## 2023-12-01 NOTE — TELEPHONE ENCOUNTER
Pt was discharged from inpatient. [facility] called and stated he does not have a prescription for oxycodone and Lovenox. Pt was covered with paper script in the PACU but no script was sent with patient upon discharge.

## 2023-12-04 PROBLEM — Z91.119 DIETARY NONCOMPLIANCE: Status: ACTIVE | Noted: 2023-12-04

## 2023-12-05 ENCOUNTER — TELEPHONE (OUTPATIENT)
Dept: ORTHOPEDIC SURGERY | Age: 58
End: 2023-12-05

## 2023-12-05 NOTE — TELEPHONE ENCOUNTER
Spoke with patient and told him that his meds were at the pharmacy but he would have to get the senokot.

## 2023-12-06 DIAGNOSIS — S72.001A CLOSED FRACTURE DISLOCATION OF RIGHT HIP JOINT, INITIAL ENCOUNTER (HCC): Primary | ICD-10-CM

## 2023-12-06 RX ORDER — OXYCODONE HYDROCHLORIDE AND ACETAMINOPHEN 5; 325 MG/1; MG/1
1 TABLET ORAL EVERY 6 HOURS PRN
Qty: 28 TABLET | Refills: 0 | Status: SHIPPED | OUTPATIENT
Start: 2023-12-06 | End: 2023-12-13

## 2023-12-06 RX ORDER — SENNA AND DOCUSATE SODIUM 50; 8.6 MG/1; MG/1
1 TABLET, FILM COATED ORAL 2 TIMES DAILY
Qty: 14 TABLET | Refills: 0 | Status: SHIPPED | OUTPATIENT
Start: 2023-12-06 | End: 2023-12-13

## 2023-12-06 NOTE — TELEPHONE ENCOUNTER
Senna and Percocet refilled    Controlled Substance Monitoring:    Acute and Chronic Pain Monitoring:   RX Monitoring Periodic Controlled Substance Monitoring   12/6/2023   2:30 PM No signs of potential drug abuse or diversion identified.

## 2023-12-08 DIAGNOSIS — S72.001A CLOSED FRACTURE DISLOCATION OF RIGHT HIP JOINT, INITIAL ENCOUNTER (HCC): Primary | ICD-10-CM

## 2023-12-08 RX ORDER — METHOCARBAMOL 750 MG/1
750 TABLET, FILM COATED ORAL 3 TIMES DAILY
Qty: 30 TABLET | Refills: 0 | Status: SHIPPED | OUTPATIENT
Start: 2023-12-08 | End: 2023-12-18

## 2023-12-08 NOTE — TELEPHONE ENCOUNTER
Patient called and left voice message requesting refill of Robaxin 500 mg.     Last office visit:    Future Appointments   Date Time Provider [address]   12/12/2023  3:00 PM Graciela Ayala MD [facility]

## 2023-12-12 ENCOUNTER — HOSPITAL ENCOUNTER (OUTPATIENT)
Dept: GENERAL RADIOLOGY | Age: 58
Discharge: HOME | End: 2023-12-14
Payer: OTHER GOVERNMENT

## 2023-12-12 ENCOUNTER — OFFICE VISIT (OUTPATIENT)
Dept: ORTHOPEDIC SURGERY | Age: 58
End: 2023-12-12
Payer: OTHER GOVERNMENT

## 2023-12-12 DIAGNOSIS — S72.001A CLOSED FRACTURE DISLOCATION OF RIGHT HIP JOINT, INITIAL ENCOUNTER (HCC): Primary | ICD-10-CM

## 2023-12-12 PROCEDURE — 99213 OFFICE O/P EST LOW 20 MIN: CPT

## 2023-12-12 PROCEDURE — 99024 POSTOP FOLLOW-UP VISIT: CPT | Performed by: PHYSICIAN ASSISTANT

## 2023-12-12 PROCEDURE — 72170 X-RAY EXAM OF PELVIS: CPT

## 2023-12-12 RX ORDER — METHOCARBAMOL 750 MG/1
750 TABLET, FILM COATED ORAL 4 TIMES DAILY
Qty: 120 TABLET | Refills: 0 | Status: SHIPPED | OUTPATIENT
Start: 2023-12-12 | End: 2024-01-11

## 2023-12-12 NOTE — PROGRESS NOTES
Chief Complaint   Patient presents with    Post-Op Check     Two week po DOS 11/28/23. Transverse acetabular fx with posterior wall through posterior Kocher-Langenbeck posterior approach. OP:SURGEON: Albina Ayala MD Carolann Matter D, DO  DATE OF PROCEDURE: 11/28/23  PROCEDURE:RIGHT ACETABULUM OPEN REDUCTION INTERNAL FIXATION, SYNTHES    Subjective:  Corin Arteaga is approximately 2 weeks follow-up from the above surgery. NWB with posterior hip precautions. Taking Lovenox for DVT ppx. Participating in PT. Denies calf pain, CP, SOB, fever, chills, myalgias. Review of Systems -  all pertinent positives and negatives in HPI. Objective:    General: Alert and oriented X 3, normocephalic atraumatic, external ears and eye normal, sclera clear, no acute distress, respirations easy and unlabored with no audible wheezes, skin warm and dry, speech and dress appropriate for noted age, affect euthymic. Extremity:  Right Lower Extremity  Skin clean dry and intact, without signs of infection  Incisions well approximated without signs of redness, warmth or drainage- sutures intact  Mild edema noted R hip   4+/5 DF   Compartments supple throughout thigh and leg  Calf supple and nontender  Demonstrates active knee flexion/extension, ankle plantarflexion, great toe extension. States sensation intact to touch in sural/deep peroneal/superficial peroneal/saphenous/posterior tibial nerve distributions to foot/ankle. Palpable dorsalis pedis and posterior tibialis pulses, cap refill brisk in toes, foot warm/perfused. XR:   3 views of pelvis demonstrating s/p ORIF R acetabulum. Hardware remains intact without interval displacement, loosening, or failure. No significant change in alignment. No acute fractures or dislocations or any other osseus abnormality identified. Assessment:   Diagnosis Orders   1.  Closed fracture dislocation of right hip joint, initial encounter ([address])  methocarbamol ([id number]-750)

## 2023-12-13 DIAGNOSIS — S72.001A CLOSED FRACTURE DISLOCATION OF RIGHT HIP JOINT, INITIAL ENCOUNTER (HCC): ICD-10-CM

## 2023-12-13 RX ORDER — OXYCODONE HYDROCHLORIDE AND ACETAMINOPHEN 5; 325 MG/1; MG/1
1 TABLET ORAL EVERY 6 HOURS PRN
Qty: 28 TABLET | Refills: 0 | Status: CANCELLED | OUTPATIENT
Start: 2023-12-13 | End: 2023-12-20

## 2023-12-13 RX ORDER — SENNA AND DOCUSATE SODIUM 50; 8.6 MG/1; MG/1
1 TABLET, FILM COATED ORAL 2 TIMES DAILY
Qty: 14 TABLET | Refills: 0 | Status: SHIPPED
Start: 2023-12-13 | End: 2023-12-20 | Stop reason: SDUPTHER

## 2023-12-13 RX ORDER — OXYCODONE HYDROCHLORIDE AND ACETAMINOPHEN 5; 325 MG/1; MG/1
1 TABLET ORAL EVERY 6 HOURS PRN
Qty: 28 TABLET | Refills: 0 | Status: SHIPPED
Start: 2023-12-13 | End: 2023-12-20 | Stop reason: SDUPTHER

## 2023-12-13 NOTE — TELEPHONE ENCOUNTER
Mychart interface requesting refill of Percocet 5/325 mg and Senokot.    Last office visit:    Future Appointments   Date Time Provider Department Center   1/16/2024  3:00 PM Nithin Ayala MD [facility]

## 2023-12-13 NOTE — TELEPHONE ENCOUNTER
Percocet refilled    Controlled Substance Monitoring:    Acute and Chronic Pain Monitoring:   RX Monitoring Periodic Controlled Substance Monitoring   12/13/2023   3:00 PM No signs of potential drug abuse or diversion identified.

## 2023-12-13 NOTE — TELEPHONE ENCOUNTER
Mychart interface requesting refill of Percocet 5/325 mg.    Last office visit:    Future Appointments   Date Time Provider Department Center   1/16/2024  3:00 PM [facility]

## 2023-12-20 DIAGNOSIS — S72.001A CLOSED FRACTURE DISLOCATION OF RIGHT HIP JOINT, INITIAL ENCOUNTER (HCC): ICD-10-CM

## 2023-12-20 RX ORDER — SENNA AND DOCUSATE SODIUM 50; 8.6 MG/1; MG/1
1 TABLET, FILM COATED ORAL 2 TIMES DAILY
Qty: 14 TABLET | Refills: 0 | Status: SHIPPED
Start: 2023-12-20 | End: 2023-12-27 | Stop reason: SDUPTHER

## 2023-12-20 RX ORDER — OXYCODONE HYDROCHLORIDE AND ACETAMINOPHEN 5; 325 MG/1; MG/1
1 TABLET ORAL EVERY 8 HOURS PRN
Qty: 21 TABLET | Refills: 0 | Status: SHIPPED
Start: 2023-12-20 | End: 2023-12-27 | Stop reason: SDUPTHER

## 2023-12-20 NOTE — TELEPHONE ENCOUNTER
Mychart interface requesting refill of Percocet 5/325 mg and Senakot.    Last office visit:    Future Appointments   Date Time Provider Department Center   1/17/2024  3:30 PM [facility]

## 2023-12-20 NOTE — TELEPHONE ENCOUNTER
Percocet weaned to Q8hrs prn  Senokot prescribed    Controlled Substance Monitoring:    Acute and Chronic Pain Monitoring:   RX Monitoring Periodic Controlled Substance Monitoring   12/20/2023   1:22 PM No signs of potential drug abuse or diversion identified.

## 2023-12-27 DIAGNOSIS — S72.001A CLOSED FRACTURE DISLOCATION OF RIGHT HIP JOINT, INITIAL ENCOUNTER (HCC): ICD-10-CM

## 2023-12-27 RX ORDER — OXYCODONE HYDROCHLORIDE AND ACETAMINOPHEN 5; 325 MG/1; MG/1
1 TABLET ORAL 2 TIMES DAILY PRN
Qty: 14 TABLET | Refills: 0 | Status: SHIPPED
Start: 2023-12-27 | End: 2024-01-03 | Stop reason: SDUPTHER

## 2023-12-27 RX ORDER — SENNA AND DOCUSATE SODIUM 50; 8.6 MG/1; MG/1
1 TABLET, FILM COATED ORAL 2 TIMES DAILY
Qty: 14 TABLET | Refills: 0 | Status: SHIPPED
Start: 2023-12-27 | End: 2024-01-03 | Stop reason: SDUPTHER

## 2023-12-27 NOTE — TELEPHONE ENCOUNTER
Percocet wean to twice daily as needed.  Senokot refilled.    Controlled Substance Monitoring:    Acute and Chronic Pain Monitoring:   RX Monitoring Periodic Controlled Substance Monitoring   12/27/2023   9:34 AM No signs of potential drug abuse or diversion identified.

## 2023-12-27 NOTE — TELEPHONE ENCOUNTER
[employer] interface requesting refill of Percocet 5/325 mg and Senakot.        Future Appointments   Date Time Provider Department Center   1/17/2024  3:30 PM Kasi Harvey,  SE [facility]

## 2023-12-28 ENCOUNTER — TELEPHONE (OUTPATIENT)
Dept: PAIN MANAGEMENT | Age: 58
End: 2023-12-28

## 2023-12-28 NOTE — TELEPHONE ENCOUNTER
Edward Lopez called in stating that he needed an increase in his pain medication. Advised patient that he would need to speak to Krystian Cook and make an appointment since he's a new patient. And that we couldn't do anything with his medication since we haven't seen him. Patient stated no one wants to help him and advised that we haven't seen him again. Patient got mad and hung up.

## 2024-01-03 DIAGNOSIS — S72.001A CLOSED FRACTURE DISLOCATION OF RIGHT HIP JOINT, INITIAL ENCOUNTER (HCC): ICD-10-CM

## 2024-01-03 RX ORDER — GABAPENTIN 300 MG/1
300 CAPSULE ORAL 3 TIMES DAILY
Qty: 90 CAPSULE | Refills: 0 | Status: SHIPPED
Start: 2024-01-03 | End: 2024-02-01 | Stop reason: SDUPTHER

## 2024-01-03 RX ORDER — METHOCARBAMOL 750 MG/1
750 TABLET, FILM COATED ORAL 4 TIMES DAILY
Qty: 120 TABLET | Refills: 0 | Status: SHIPPED
Start: 2024-01-03 | End: 2024-01-24

## 2024-01-03 RX ORDER — OXYCODONE HYDROCHLORIDE AND ACETAMINOPHEN 5; 325 MG/1; MG/1
1 TABLET ORAL 2 TIMES DAILY PRN
Qty: 14 TABLET | Refills: 0 | Status: SHIPPED
Start: 2024-01-03 | End: 2024-01-12 | Stop reason: SDUPTHER

## 2024-01-03 RX ORDER — SENNA AND DOCUSATE SODIUM 50; 8.6 MG/1; MG/1
1 TABLET, FILM COATED ORAL 2 TIMES DAILY
Qty: 14 TABLET | Refills: 0 | Status: SHIPPED | OUTPATIENT
Start: 2024-01-03 | End: 2024-01-10

## 2024-01-03 NOTE — TELEPHONE ENCOUNTER
Gabapentin refilled    Controlled Substance Monitoring:    Acute and Chronic Pain Monitoring:   RX Monitoring Periodic Controlled Substance Monitoring   1/3/2024   3:42 PM No signs of potential drug abuse or diversion identified.

## 2024-01-03 NOTE — TELEPHONE ENCOUNTER
[employer] interface requesting refill of Gabapentin (write dose) 300mg.        Future Appointments   Date Time Provider Department Center   1/16/2024  1:30 PM Zulay Sheehan,  [facility]   1/17/2024  3:30 PM Kasi Harvey DO SE [facility]

## 2024-01-03 NOTE — TELEPHONE ENCOUNTER
Senokot, Robaxin, Percocet refilled    Controlled Substance Monitoring:    Acute and Chronic Pain Monitoring:   RX Monitoring Periodic Controlled Substance Monitoring   1/3/2024   3:42 PM No signs of potential drug abuse or diversion identified.

## 2024-01-03 NOTE — TELEPHONE ENCOUNTER
[employer] interface requesting refill of Percocet 5/325 mg and Robaxin 750 mg and Senakot.        Future Appointments   Date Time Provider Department Center   1/16/2024  1:30 PM Zulay Sheehan DO [facility]   1/17/2024  3:30 PM Kasi Harvey DO SE [facility]TH[facility]

## 2024-01-16 ENCOUNTER — HOSPITAL ENCOUNTER (OUTPATIENT)
Dept: MRI IMAGING | Age: 59
Discharge: HOME | End: 2024-01-18
Attending: PAIN MEDICINE
Payer: OTHER GOVERNMENT

## 2024-01-16 ENCOUNTER — OFFICE VISIT (OUTPATIENT)
Dept: PAIN MANAGEMENT | Age: 59
End: 2024-01-16
Payer: COMMERCIAL

## 2024-01-16 VITALS
WEIGHT: 302 LBS | DIASTOLIC BLOOD PRESSURE: 72 MMHG | OXYGEN SATURATION: 97 % | TEMPERATURE: 98.2 F | BODY MASS INDEX: 37.55 KG/M2 | SYSTOLIC BLOOD PRESSURE: 137 MMHG | RESPIRATION RATE: 16 BRPM | HEIGHT: 75 IN | HEART RATE: 86 BPM

## 2024-01-16 DIAGNOSIS — M51.9 LUMBAR DISC DISORDER: ICD-10-CM

## 2024-01-16 DIAGNOSIS — G89.11 ACUTE PAIN DUE TO TRAUMA: ICD-10-CM

## 2024-01-16 DIAGNOSIS — M54.41 CHRONIC RIGHT-SIDED LOW BACK PAIN WITH RIGHT-SIDED SCIATICA: ICD-10-CM

## 2024-01-16 DIAGNOSIS — G89.29 CHRONIC RIGHT-SIDED LOW BACK PAIN WITH RIGHT-SIDED SCIATICA: ICD-10-CM

## 2024-01-16 DIAGNOSIS — Z79.891 ENCOUNTER FOR LONG-TERM OPIATE ANALGESIC USE: ICD-10-CM

## 2024-01-16 DIAGNOSIS — M54.16 LUMBAR RADICULOPATHY: ICD-10-CM

## 2024-01-16 DIAGNOSIS — M54.16 LUMBAR RADICULOPATHY: Primary | ICD-10-CM

## 2024-01-16 PROBLEM — G89.4 CHRONIC PAIN SYNDROME: Status: ACTIVE | Noted: 2024-01-16

## 2024-01-16 PROCEDURE — 99204 OFFICE O/P NEW MOD 45 MIN: CPT | Performed by: PAIN MEDICINE

## 2024-01-16 PROCEDURE — 99205 OFFICE O/P NEW HI 60 MIN: CPT | Performed by: PAIN MEDICINE

## 2024-01-16 PROCEDURE — 72148 MRI LUMBAR SPINE W/O DYE: CPT

## 2024-01-16 NOTE — PROGRESS NOTES
[facility]        [address] [address]  Dept: [phone]        Patient:  KEREN Gaston 1965    Date of Service:  24     Requesting Physician:  Nile Denise PA    Reason for Consult:      Patient presents with complaints of right leg pain that started >1 months ago    HISTORY OF PRESENT ILLNESS:      Pain is constant and is described as sharp, aching and stabbing.     Pain does radiate to right leg. He  has weakness of the right leg and does not have bladder or bowel dysfunction.    Alleviating factors include: gabapentin.  Aggravating factors include:  movement.     He has been on anticoagulation medications to include LOVENOX and has not been on herbal supplements.  He is diabetic.    Imagin/2023 xray pelvis -  FINDINGS:  Patient had recent ORIF for a right acetabular fracture with placement of 2  chains fix by perpendicular screws.  There is now a early subacute phase for  the fracture.     In the oblique projections there is mild displacement of fragments but this  was also seen on the CT scan examination  which was a  postoperative evaluation.  There is no significant impingement of the cavity.  Some of the screws projecting May on the inner cortical bone contour most  likely of the supra acetabular iliac bone.     IMPRESSION:  Status post ORIF for fracture of the right acetabulum in subacute phase.     There is mild displacement of the fragments as seen the CT scan examination       No significant bony encroachment of the inner pelvic wall although some of  the screws projects mildly May and the inner cortical bone contour which more  likely is relate with the iliac bone supra-acetabular area.    2023 xray rt ankle -  FINDINGS:     Bones/joints:  No acute findings.  No acute fracture.  No dislocation.     Soft tissues:  Possible mild diffuse ankle soft tissue swelling.     IMPRESSION:     Possible mild

## 2024-01-16 NOTE — PROGRESS NOTES
Monico Gomez presents to the [facility] on 1/16/2024. Monico is complaining of pain right hip. The pain is constant. The pain is described as aching and stabbing. Pain is rated on his best day at a 7, on his worst day at a 9, and on average at a 8 on the VAS scale. He took his last dose of Percocet this morning .    Any procedures since your last visit: No    He is not on NSAIDS and  is  on anticoagulation medications to include lovenox   Pacemaker or defibrillator: No       Medication Contract and Consent for Opioid Use Documents Filed        No documents found                       Temp 98.2 °F (36.8 °C) (Temporal)   Resp 16   Ht 1.905 m (6' 3\")   Wt (!) 137 kg (302 lb)   SpO2 97%   BMI 37.75 kg/m²      No LMP for male patient.

## 2024-01-17 ENCOUNTER — OFFICE VISIT (OUTPATIENT)
Dept: ORTHOPEDIC SURGERY | Age: 59
End: 2024-01-17

## 2024-01-17 DIAGNOSIS — S72.001A CLOSED FRACTURE DISLOCATION OF RIGHT HIP JOINT, INITIAL ENCOUNTER (HCC): Primary | ICD-10-CM

## 2024-01-17 LAB
SEND OUT REPORT: NORMAL
TEST NAME: NORMAL

## 2024-01-17 PROCEDURE — 99024 POSTOP FOLLOW-UP VISIT: CPT | Performed by: STUDENT IN AN ORGANIZED HEALTH CARE EDUCATION/TRAINING PROGRAM

## 2024-01-17 PROCEDURE — 1073RET COMPLETION OF WORKABILITY PRESCRIPTION: Performed by: STUDENT IN AN ORGANIZED HEALTH CARE EDUCATION/TRAINING PROGRAM

## 2024-01-17 RX ORDER — OXYCODONE HYDROCHLORIDE AND ACETAMINOPHEN 5; 325 MG/1; MG/1
1 TABLET ORAL EVERY 6 HOURS PRN
Qty: 28 TABLET | Refills: 0 | Status: SHIPPED | OUTPATIENT
Start: 2024-01-17 | End: 2024-01-24

## 2024-01-17 NOTE — PROGRESS NOTES
Follow Up Post Operative Visit     Surgery: Open reduction internal fixation right acetabulum fracture  Date: 12/28/2023    Subjective:    Monico Gomez is here for follow up visit s/p above procedure.  He is doing well.  He is having some leg pain and pain in his right ankle.  Has been compliant with his touchdown weightbearing.  He has been using Lovenox for DVT prophylaxis.  He is having some issues with pain and went to see pain management yesterday and had a lumbar spine MRI.  He actually saw one of my partners at his first postoperative visit but will be continuing to follow-up here.  Denies any incisional problems.  Denies numbness tingling.  Denies fevers and chills.  His biggest complaint is right ankle pain and decreased range of motion.      Controlled Substances Monitoring:        Physical Exam:    Height: 1.905 m (6' 3\"), Weight - Scale: (!) 137 kg (302 lb), BP: 137/72    General: Alert and oriented x3, no acute distress  Cardiovascular/pulmonary: No labored breathing, peripheral perfusion intact  Musculoskeletal:    Right hip: Incision well-approximated.  No pain with logroll.  He does complain of some pain posteriorly along his greater trochanter.  Thigh soft compressible.  No pain with logroll of the hip.  He has a dense foot drop with inability to dorsiflex.  He can fire his EHL.  Sensations intact sural saphenous superficial peroneal deep peroneal tibial nerve distribution.      Imaging: AP pelvis and multiple views of the right hip demonstrate intact hardware and healing fracture of the acetabulum with a congruent joint    Assessment and Plan: 7 weeks status post open reduction internal fixation right acetabulum fracture    -We had a long discussion about his injuries.  I explained to him that this fracture pattern has a high incidence of posttraumatic arthritis along with heterotopic ossification.  There is also high risk of sciatic nerve injury with hip dislocation and the surgery.  He

## 2024-01-24 ENCOUNTER — EVALUATION (OUTPATIENT)
Dept: PHYSICAL THERAPY | Age: 59
End: 2024-01-24

## 2024-01-24 DIAGNOSIS — M25.551 RIGHT HIP PAIN: Primary | ICD-10-CM

## 2024-01-24 DIAGNOSIS — S72.001A CLOSED FRACTURE DISLOCATION OF RIGHT HIP JOINT, INITIAL ENCOUNTER (HCC): ICD-10-CM

## 2024-01-24 DIAGNOSIS — M25.571 RIGHT ANKLE PAIN, UNSPECIFIED CHRONICITY: ICD-10-CM

## 2024-01-24 RX ORDER — METHOCARBAMOL 750 MG/1
750 TABLET, FILM COATED ORAL 4 TIMES DAILY
Qty: 120 TABLET | Refills: 0 | Status: SHIPPED | OUTPATIENT
Start: 2024-01-24

## 2024-01-24 NOTE — PROGRESS NOTES
[facility]   Phone: [phone]   Fax: [phone]           Date:  2024   Patient: Monico Gomez  : 1965  MRN: [id number]  Referring Provider: Kasi Harvey DO  [address]  [address]  [place] [address]     Medical Diagnosis:     R hip pain    SUBJECTIVE:     Surgical Date: 2023    Surgical Procedure:  Open reduction internal fixation right acetabulum fracture     Onset date: 2023    Onset: Sudden onset    Mechanism of Injury: Pt reports that he fell from his treestand on 2023 resulting in R acetabular fracture. He had appr a week and a half of home PT and has been performing exercises on his own. Surgeon has concerns of R sided sciatic nerve injury resulting in foot drop in R foot. He is touch down WB at this time.     Previous PT: home PT    Medical Management for Current Problem:  pain medication    Chief complaint: pain, morning pain / stiffness, pain that worsens as day wears on , edema, decreased motion, decreased mobility, weakness, inability / limited ability to use leg, difficulty walking, difficulty with stairs, limited ability to lift/carry/handle material, limited ability to complete home/outdoor chores/tasks, inability to participate in exercise regimen / fitness program    Behavior: condition is waxing / waning    Pain: waxing and waning  Current: 10     Best: 10     Worst:10/10    Symptom Type/Quality: sharp, aching  Location:: Hip: lumbar region radiates down the posterior right leg  into the ankle       Provoking Activities/Positions: sitting, standing, walking, prolonged sitting, prolonged standing, walking long distances, bending, lifting/carrying/material handling, rising to standing, getting up from low positions                  Relieving Activitie/Positions: medication    Disturbed Sleep: yes     Imaging results: XR PELVIS (MIN 3 VIEWS)    Result Date: 2024  EXAMINATION: ONE XRAY VIEW OF THE PELVIS

## 2024-01-24 NOTE — TELEPHONE ENCOUNTER
Received request from Pharmacy requesting refill of Robaxin 750 mg at [facility]    Date of Procedure: 11/28/2023       Procedure(s):  RIGHT ACETABULUM OPEN REDUCTION INTERNAL FIXATION, SYNTHES     Surgeon(s):  Nithin Ayala MD Hoffman, Adam D, DO  Weeks from Surgery: 8 wks    Last OV: 1/17/24    Medication pended and routed to providers for decision and signature.    Future Appointments   Date Time Provider Department Center   1/24/2024  5:00 PM Rodriguez Baez, PT [facility] RE[facility]AB [facility]   2/21/2024  3:20 PM Kasi Harvey,  SE [facility]       Electronically signed by Fabiana Hendricks RN on 1/24/2024 at 8:41 AM

## 2024-01-26 NOTE — PROGRESS NOTES
[facility]   Phone: [phone]   Fax: [phone]      Physical Therapy Daily Treatment Note    Date: 2024  Patient Name: Monico Gomez  : 1965   MRN: [id number]  DOInjury: 2023   DOSx: 2023  Referring Provider: Kasi Harvey DO  [address]  [address]  [place] [address]     Medical Diagnosis:     R hip pain  R ankle pain    Outcome Measure: LEFS 84% limitation    S: see eval  O:   Time 510-550     Visit  Repeat outcome measure at mid point and end.    Pain 6/10           Modalities            Manual            Stretch                  Exercise      Ankle band X30 ea 4 direction TE   Calf stretch X5 3s holds  TE         Injury education, edema control X10 min  SCHM                                                     NMR To improve balance for safe community and home ambulation    Resisted walk      FWD      BKWD      lat      March      Side stepping      Retro walk      Heel to toe      A:  Tolerated well.  Above added to written HEP and will perform daily.  P: Continue with rehab plan  Rodriguez Baez, PT DPT, PT [id number]    Treatment Charges: Mins Units   Initial Evaluation 15 1   Re-Evaluation     Ther Exercise         TE 15 1   Manual Therapy     MT     Ther Activities        TA     Gait Training          GT     Neuro Re-education NR     Modalities     Non-Billable Service Time     Other 10 1   Total Time/Units 40 3

## 2024-01-27 LAB
SEND OUT REPORT: NORMAL
TEST NAME: NORMAL

## 2024-02-01 ENCOUNTER — TREATMENT (OUTPATIENT)
Dept: PHYSICAL THERAPY | Age: 59
End: 2024-02-01
Payer: COMMERCIAL

## 2024-02-01 ENCOUNTER — PATIENT MESSAGE (OUTPATIENT)
Dept: ORTHOPEDIC SURGERY | Age: 59
End: 2024-02-01

## 2024-02-01 ENCOUNTER — TELEPHONE (OUTPATIENT)
Dept: PAIN MANAGEMENT | Age: 59
End: 2024-02-01

## 2024-02-01 DIAGNOSIS — M25.571 RIGHT ANKLE PAIN, UNSPECIFIED CHRONICITY: ICD-10-CM

## 2024-02-01 DIAGNOSIS — S72.001A CLOSED FRACTURE DISLOCATION OF RIGHT HIP JOINT, INITIAL ENCOUNTER (HCC): ICD-10-CM

## 2024-02-01 DIAGNOSIS — M25.551 RIGHT HIP PAIN: Primary | ICD-10-CM

## 2024-02-01 DIAGNOSIS — S72.001A CLOSED FRACTURE DISLOCATION OF RIGHT HIP JOINT, INITIAL ENCOUNTER (HCC): Primary | ICD-10-CM

## 2024-02-01 PROCEDURE — 97110 THERAPEUTIC EXERCISES: CPT | Performed by: PHYSICAL THERAPIST

## 2024-02-01 PROCEDURE — 97112 NEUROMUSCULAR REEDUCATION: CPT | Performed by: PHYSICAL THERAPIST

## 2024-02-01 RX ORDER — GABAPENTIN 300 MG/1
300 CAPSULE ORAL 3 TIMES DAILY
Qty: 90 CAPSULE | Refills: 0 | Status: SHIPPED
Start: 2024-02-01 | End: 2024-02-08 | Stop reason: SDUPTHER

## 2024-02-01 RX ORDER — OXYCODONE HYDROCHLORIDE AND ACETAMINOPHEN 5; 325 MG/1; MG/1
1 TABLET ORAL EVERY 6 HOURS PRN
Qty: 28 TABLET | Refills: 0 | Status: SHIPPED | OUTPATIENT
Start: 2024-02-01 | End: 2024-02-08

## 2024-02-01 RX ORDER — METHOCARBAMOL 750 MG/1
750 TABLET, FILM COATED ORAL 4 TIMES DAILY
Qty: 120 TABLET | Refills: 0 | Status: SHIPPED
Start: 2024-02-01 | End: 2024-02-08 | Stop reason: SDUPTHER

## 2024-02-01 NOTE — TELEPHONE ENCOUNTER
Monico S Davidson called in requesting a refill on Percocet. I told him I would send a message to  she is waiting for his drug screen to come back. Patient go mad that I couldn't help right now and asked to speak with someone else that could help him.

## 2024-02-01 NOTE — TELEPHONE ENCOUNTER
[employer] interface requesting refill of Robaxin 750 mg and Gabapentin (write dose) 300mg.        Future Appointments   Date Time Provider Department Center   2/1/2024  5:00 PM Barrett Bonilla, PT [facility]   2/5/2024  5:00 PM Nora Flores PTA [facility]   2/8/2024  5:00 PM Barrett Bonilla, PT [facility]   2/12/2024  5:00 PM Nora Flores PTA [facility]   2/15/2024  5:00 PM Barrett Bonilla, PT [facility]   2/19/2024  5:00 PM Rodriguez Baez, PT [facility]   2/21/2024  3:20 PM Kasi Harvey DO SE [facility] ORTH[facility]   2/21/2024  5:00 PM Rodriguez Baez, PT [facility] RE[facility]

## 2024-02-01 NOTE — TELEPHONE ENCOUNTER
I spoke with Monico Gomezand advised that we could not give him an answer right now. I advised hi that Dr. Sheehan is currently seeing patients on the office and normally respond to her messages at the end of the day. He stated that he will be out of medication on Saturday and that he knows we would not be open over the weekend. I did advise him we are not open on the weekends and that he should get a response sometime later today or tomorrow. He then stated \"I guess I will have to go somewhere else\" then he hung up on me.

## 2024-02-02 NOTE — PROGRESS NOTES
[facility]   Phone: [phone]   Fax: [phone]      Physical Therapy Daily Treatment Note    Date: 2024  Patient Name: Monico Gomez  : 1965   MRN: [id number]  DOInjury: 2023   DOSx: 2023  Referring Provider: Kasi Harvey DO  [address]  [address]  [place] [address]     Medical Diagnosis:     R hip pain  R ankle pain    Outcome Measure: LEFS 84% limitation    S: Pt c/o right leg pain.     O: please refer to PT Eval  Time 1505-4177     Visit  Repeat outcome measure at mid point and end.    Pain /10           Modalities            Manual            Stretch                  Exercise            Exercises X5min LAQ  X30 supine straight leg hip abd/add  X30 quad sets  X30 SAQ  X30 supine SLR  X30 side-lying hip abd  X30 side-lying clamshell  X30 supine arom clamshell  X30 supine clamshell blue band  X30 prone hip ext  X30 seated ;eg curl blue band  X30 prone leg curl blue band  X30 supine hamstring stretch  X30 seated hamstring stretch mat yoga stretch  NR  TE                                               NMR To improve balance for safe community and home ambulation    Resisted walk      FWD      BKWD      lat      March      Side stepping      Retro walk      Heel to toe      A:  Tolerated well.  Pt progress with today's Tx session to perform new exercises to promote strength, stability, & proper muscle activation RLE.     P: Continue with rehab plan & progress appropriately following orthopedic physician's protocol.     Barrett Bonilla, PT [facility] [id number]    Treatment Charges: Mins Units   Initial Evaluation     Re-Evaluation     Ther Exercise         TE 35 2   Manual Therapy     MT     Ther Activities        TA     Gait Training          GT     Neuro Re-education NR 25 2   Modalities     Non-Billable Service Time     Other     Total Time/Units 60 4

## 2024-02-05 ENCOUNTER — TREATMENT (OUTPATIENT)
Dept: PHYSICAL THERAPY | Age: 59
End: 2024-02-05
Payer: COMMERCIAL

## 2024-02-05 DIAGNOSIS — M25.551 RIGHT HIP PAIN: Primary | ICD-10-CM

## 2024-02-05 DIAGNOSIS — M25.571 RIGHT ANKLE PAIN, UNSPECIFIED CHRONICITY: ICD-10-CM

## 2024-02-05 PROCEDURE — 97112 NEUROMUSCULAR REEDUCATION: CPT

## 2024-02-05 PROCEDURE — 97110 THERAPEUTIC EXERCISES: CPT

## 2024-02-05 NOTE — PROGRESS NOTES
[facility]   Phone: [phone]   Fax: [phone]      Physical Therapy Daily Treatment Note    Date: 2024  Patient Name: Monico Gomez  : 1965   MRN: [id number]  DOInjury: 2023   DOSx: 2023  Referring Provider: Kasi Harvey DO  [address]  [address]  [place] [address]     Medical Diagnosis:   R hip pain  R ankle pain    Outcome Measure: LEFS 84% limitation    S: Patient reports to therapy and states he is compliant c I HEPs.    O: please refer to PT Eval  Time 422-502     Visit 3/6 Repeat outcome measure at mid point and end.    Pain 6/10     Modalities      Manual            Stretch                  Exercise      Ankle band X30 ea 4 direction TE   Calf stretch X10 10s holds  TE   Ankle circles  X20 each direction  TE   Ankle ROM  X20  DF/PF/Inversion/Eversion  TE   Rocker board  x20 Fwd/Lateral TE   HR/TR x20  TE         Exercises X30 quad sets  X30 supine SLR  X30 side-lying hip abd  X30 side-lying clamshell  X30 supine clamshell blue band  X30 seated leg curl blue band  X30 supine hamstring stretch  X30 seated hamstring stretch mat yoga stretch  NR  TE                           A:  Tolerated well. Patient had difficulty c ankle AROM d/t limited mobility. Continued to focus on improving mobility & strength. Continued to follow protocol during therapy.    P: Continue with rehab plan & progress appropriately following orthopedic physician's protocol.   Nora Flores PTA [id number]    Treatment Charges: Mins Units   Initial Evaluation     Re-Evaluation     Ther Exercise         TE 15 1   Manual Therapy     MT     Ther Activities        TA     Gait Training          GT     Neuro Re-education NR 25 2   Modalities     Non-Billable Service Time     Other     Total Time/Units 40 3

## 2024-02-08 ENCOUNTER — TREATMENT (OUTPATIENT)
Dept: PHYSICAL THERAPY | Age: 59
End: 2024-02-08
Payer: COMMERCIAL

## 2024-02-08 DIAGNOSIS — M25.551 RIGHT HIP PAIN: Primary | ICD-10-CM

## 2024-02-08 DIAGNOSIS — M25.571 RIGHT ANKLE PAIN, UNSPECIFIED CHRONICITY: ICD-10-CM

## 2024-02-08 DIAGNOSIS — S72.001A CLOSED FRACTURE DISLOCATION OF RIGHT HIP JOINT, INITIAL ENCOUNTER (HCC): ICD-10-CM

## 2024-02-08 PROCEDURE — 97112 NEUROMUSCULAR REEDUCATION: CPT | Performed by: PHYSICAL THERAPIST

## 2024-02-08 PROCEDURE — 97110 THERAPEUTIC EXERCISES: CPT | Performed by: PHYSICAL THERAPIST

## 2024-02-08 RX ORDER — OXYCODONE HYDROCHLORIDE AND ACETAMINOPHEN 5; 325 MG/1; MG/1
1 TABLET ORAL EVERY 6 HOURS PRN
Qty: 28 TABLET | Refills: 0 | Status: SHIPPED | OUTPATIENT
Start: 2024-02-08 | End: 2024-02-15

## 2024-02-08 RX ORDER — GABAPENTIN 300 MG/1
300 CAPSULE ORAL 3 TIMES DAILY
Qty: 90 CAPSULE | Refills: 0 | Status: SHIPPED | OUTPATIENT
Start: 2024-02-08 | End: 2024-03-09

## 2024-02-08 RX ORDER — METHOCARBAMOL 750 MG/1
750 TABLET, FILM COATED ORAL 4 TIMES DAILY
Qty: 120 TABLET | Refills: 0 | Status: SHIPPED | OUTPATIENT
Start: 2024-02-08

## 2024-02-08 NOTE — TELEPHONE ENCOUNTER
Pharmacy interface requesting refill of Robaxin 750 mg and Gabapentin (write dose)300mg.    Last office visit:  Future Appointments   Date Time Provider Department Center   2/8/2024  5:00 PM Barrett Bonilla, PT [facility]   2/12/2024  5:00 PM Nora Flores PTA [facility]   2/15/2024  5:00 PM Barrett Bonilla, PT [facility]   2/19/2024  5:00 PM Rodriguez Baez, PT [facility]   2/21/2024  3:20 PM Kasi Harvey DO SE [facility] ORTH[facility]   2/21/2024  5:00 PM Rodriguez Baez, PT [facility] REHAB [facility]

## 2024-02-09 NOTE — PROGRESS NOTES
[facility]   Phone: [phone]   Fax: [phone]      Physical Therapy Daily Treatment Note    Date: 2024  Patient Name: Monico Gomez  : 1965   MRN: [id number]  DOInjury: 2023   DOSx: 2023  Referring Provider: Kasi Harvey DO  [address]  [address]  [place] [address]     Medical Diagnosis:   R hip pain  R ankle pain    Outcome Measure: LEFS 84% limitation    S: Pt reports good compliance with HEP.    O: please refer to PT Eval  Time 4146-9868     Visit /6 Repeat outcome measure at mid point and end.    Pain /10     Modalities      Manual            Stretch                  Exercise            Exercises X30 quad sets heel propped  X30 SAQ 10lbs  X30 supine SLR  X30 side-lying hip abd  X30 side-lying clamshell blue band  X30 supine arom clamshell purple band  X30 supine clamshell blue band  X30 prone hip ext  X30 glute sets  X30 ankle plantarflex, dorsiflex, inversion, & eversion lime band  d   NR  TE                           A:  Tolerated well. The pt continues to display weakness at right hip abductors & extensors, & limited rom right ankle & weakness right ankle..    P: Continue with rehab plan & progress appropriately following orthopedic physician's protocol.     Barrett Bonilla, PT [facility] [id number]    Treatment Charges: Mins Units   Initial Evaluation     Re-Evaluation     Ther Exercise         TE 30 2   Manual Therapy     MT     Ther Activities        TA     Gait Training          GT     Neuro Re-education NR 30 2   Modalities     Non-Billable Service Time     Other     Total Time/Units 60 4

## 2024-02-12 ENCOUNTER — TREATMENT (OUTPATIENT)
Dept: PHYSICAL THERAPY | Age: 59
End: 2024-02-12
Payer: COMMERCIAL

## 2024-02-12 DIAGNOSIS — M25.571 RIGHT ANKLE PAIN, UNSPECIFIED CHRONICITY: ICD-10-CM

## 2024-02-12 DIAGNOSIS — M25.551 RIGHT HIP PAIN: Primary | ICD-10-CM

## 2024-02-12 PROCEDURE — 97110 THERAPEUTIC EXERCISES: CPT | Performed by: PHYSICAL THERAPIST

## 2024-02-12 PROCEDURE — 97112 NEUROMUSCULAR REEDUCATION: CPT | Performed by: PHYSICAL THERAPIST

## 2024-02-12 NOTE — PROGRESS NOTES
[facility]   Phone: [phone]   Fax: [phone]      Physical Therapy Daily Treatment Note    Date: 2024  Patient Name: Monico Gomez  : 1965   MRN: [id number]  DOInjury: 2023   DOSx: 2023  Referring Provider: Kasi Harvey DO  [address]  [address]  [place] [address]     Medical Diagnosis:   R hip pain  R ankle pain    Outcome Measure: LEFS 84% limitation    S: Pt reports improved mobility in the ankle today.     O: please refer to PT Eval  Time 505-545     Visit 5/6 Repeat outcome measure at mid point and end.    Pain 6/10     Modalities      Manual            Stretch                  Exercise            Exercises d   NR  TE   Russian  X25 min 5/5 dorsiflexion, 5/5 dorsi/plantarflexion NR   Calf stretch X20 10s holds  TE   SLR X10 10s holds  TE         A:  Tolerated well. Pt was progressed today c Russian stim for PF/DF strength and mobility. He tolerated the session well c no incr in pain. He is improving c ankle mobility but continues to have weakness and restricted mobility.    P: Continue with rehab plan & progress appropriately following orthopedic physician's protocol.     Rodriguez Baez PT DPT [id number]      Treatment Charges: Mins Units   Initial Evaluation     Re-Evaluation     Ther Exercise         TE 15 1   Manual Therapy     MT     Ther Activities        TA     Gait Training          GT     Neuro Re-education NR 25 2   Modalities     Non-Billable Service Time     Other     Total Time/Units 40 3

## 2024-02-15 ENCOUNTER — TREATMENT (OUTPATIENT)
Dept: PHYSICAL THERAPY | Age: 59
End: 2024-02-15

## 2024-02-15 ENCOUNTER — TELEPHONE (OUTPATIENT)
Dept: PAIN MANAGEMENT | Age: 59
End: 2024-02-15

## 2024-02-15 DIAGNOSIS — M25.551 RIGHT HIP PAIN: Primary | ICD-10-CM

## 2024-02-15 DIAGNOSIS — S72.001A CLOSED FRACTURE DISLOCATION OF RIGHT HIP JOINT, INITIAL ENCOUNTER (HCC): ICD-10-CM

## 2024-02-15 DIAGNOSIS — M25.571 RIGHT ANKLE PAIN, UNSPECIFIED CHRONICITY: ICD-10-CM

## 2024-02-15 RX ORDER — METHOCARBAMOL 750 MG/1
750 TABLET, FILM COATED ORAL 4 TIMES DAILY
Qty: 120 TABLET | Refills: 0 | OUTPATIENT
Start: 2024-02-15

## 2024-02-15 RX ORDER — GABAPENTIN 300 MG/1
300 CAPSULE ORAL 3 TIMES DAILY
Qty: 90 CAPSULE | Refills: 0 | OUTPATIENT
Start: 2024-02-15 | End: 2024-03-16

## 2024-02-15 NOTE — TELEPHONE ENCOUNTER
Dr. Harvey,    See below.  This is the message I sent to him 2/2/2024 (see his multiple telephone encounters and [employer]).  Since sending this message to him I have not received a response thus I am taking this to mean he would like to seek pain mgmt care outside of our practice as he had stated prior to discontinuing a call with our office.    \"Dear Mr. Gomez,     I'm confused as to which direction you are hoping to go with your treatment.  When you called into the office on Wednesday requesting a refill of your medication that would run out on Saturday and the staff said they could not immediately connect you to me to discuss you had mentioned you were going to seek treatment elsewhere and disconnected the call.  Therefore, I did not return your call or send over a prescription.  Please let me your plans in moving forward with your care in regards to whether or not you are going to continue treatment with our practice and if so we can consider refilling your medication when due (I did get your message that you received a refill from Dr. Harvey).      Sincerely,  Dr. Sheehan\"    Thank you  Dr. Sheehan

## 2024-02-15 NOTE — TELEPHONE ENCOUNTER
Gabapentin and robaxin refused. These were filled on 2/8/24. Patient was provided a 1 month rx for both of these medications.

## 2024-02-15 NOTE — TELEPHONE ENCOUNTER
Dr. Nguyen office called regarding his buccal he has on 1/16/24. Dr. Harvey is getting leary on prescribing pain meds and was wondering if you were prescribing them post op. I advised that I would advise you and call patient back with buccal results.

## 2024-02-16 ENCOUNTER — TELEPHONE (OUTPATIENT)
Dept: PAIN MANAGEMENT | Age: 59
End: 2024-02-16

## 2024-02-16 RX ORDER — OXYCODONE HYDROCHLORIDE AND ACETAMINOPHEN 5; 325 MG/1; MG/1
1 TABLET ORAL EVERY 6 HOURS PRN
Qty: 28 TABLET | Refills: 0 | OUTPATIENT
Start: 2024-02-16 | End: 2024-02-23

## 2024-02-16 NOTE — PROGRESS NOTES
[facility]   Phone: [phone]   Fax: [phone]      Physical Therapy Daily Treatment Note    Date: 2/15/2024  Patient Name: Monico Gomez  : 1965   MRN: [id number]  DOInjury: 2023   DOSx: 2023  Referring Provider: Kasi Harvey DO  [address]  [address]  [place] [address]     Medical Diagnosis:   R hip pain  R ankle pain    Outcome Measure: LEFS 84% limitation    S: Pt continues to c/o weakness at RLE ankle with foot drop.     O: please refer to PT Eval  Time 9236-9538     Visit 6/12 Repeat outcome measure at mid point and end.    Pain 6/10     Modalities      Manual            Stretch                  Exercise            Exercises X30 supine SLR arom  X30 supine SLR blue band  X30 side-lying hip abd  X30 side-lying clamshell blue band  X30 prone hip ext  X30 seated leg curl blue band  X30 prone leg curl blue band   NR  TE   Marshallese Estim X15 min 5/5 dorsiflexion NR         A:  Tolerated well. Pt continues to experience RLE foot drop but displayed improved RLE hip abd & ext.    P: Continue with rehab plan & progress appropriately following orthopedic physician's protocol.     Barrett Boinlla, PT [facility] [id number]      Treatment Charges: Mins Units   Initial Evaluation     Re-Evaluation     Ther Exercise         TE 30 2   Manual Therapy     MT     Ther Activities        TA     Gait Training          GT     Neuro Re-education NR 30 2   Modalities     Non-Billable Service Time     Other     Total Time/Units 60 4

## 2024-02-16 NOTE — TELEPHONE ENCOUNTER
After review with Dr. Sheehan, Monico was advised that due to his initial call with demanding Dr. Sheehan review during office hours (see my last note from 2/1/2024) and that the messages incorrectly stating what he was told, she would not feel comfortable taking over his pain prescriptions. I advised hi that Dr. Sheehan did speak with Dr. Harvey. He then stated he was going to turn us into the regulatory agencies and that we give poor care. He then hung up on me.

## 2024-02-16 NOTE — TELEPHONE ENCOUNTER
Monico S Davidson called stating he was told by his surgeon that he needed to call Dr. Sheehan for his pain medication refill.

## 2024-02-16 NOTE — TELEPHONE ENCOUNTER
Spoke with patient.  He states he was very frustrated with time frame from required testing to start of treatment by Dr Sheehan.  Dr Sheehan did respond to patient not knowing if phone call he made stating he would seek treatment elsewhere and hanging up phone meant just that. He states he meant that he would call Dr Harvey for refills while he waits for review of requested testing by Dr Sheehan.  Instructed patient to call Dr Sheehan's office to let them know that he definitely wants to continue care if Dr Sheehan.

## 2024-02-19 ENCOUNTER — TREATMENT (OUTPATIENT)
Dept: PHYSICAL THERAPY | Age: 59
End: 2024-02-19
Payer: COMMERCIAL

## 2024-02-19 DIAGNOSIS — M25.551 RIGHT HIP PAIN: Primary | ICD-10-CM

## 2024-02-19 DIAGNOSIS — M25.571 RIGHT ANKLE PAIN, UNSPECIFIED CHRONICITY: ICD-10-CM

## 2024-02-19 PROCEDURE — 97112 NEUROMUSCULAR REEDUCATION: CPT

## 2024-02-19 PROCEDURE — 97110 THERAPEUTIC EXERCISES: CPT

## 2024-02-19 NOTE — PROGRESS NOTES
[facility]   Phone: [phone]   Fax: [phone]      Physical Therapy Daily Treatment Note    Date: 2024  Patient Name: Monico Gomez  : 1965   MRN: [id number]  DOInjury: 2023   DOSx: 2023  Referring Provider: Kasi Harvey DO  [address]  [address]  [place] [address]      Medical Diagnosis:   R hip pain  R ankle pain    Outcome Measure: LEFS 84% limitation    S: Pt continues to c/o weakness at RLE ankle with foot drop.     O: please refer to PT Eval  Time 352-445     Visit  Repeat outcome measure at mid point and end.    Pain 6/10     Modalities      Manual            Stretch                  Exercise      Rocker board  x30 Fwd/Lateral TE         Exercises X30 supine SLR arom  X30 supine SLR blue band  X30 side-lying hip abd  X30 side-lying clamshell blue band  X30 prone hip ext  X30 prone leg curl blue band   NR  TE   Andorran Estim X15 min 5/5 dorsiflexion NR         A:  Tolerated well. Pt continues to experience RLE foot drop, thus provided assistance during Portuguese.     P: Continue with rehab plan & progress appropriately following orthopedic physician's protocol.   Nora Flores PTA [id number]      Treatment Charges: Mins Units   Initial Evaluation     Re-Evaluation     Ther Exercise         TE 28 2   Manual Therapy     MT     Ther Activities        TA     Gait Training          GT     Neuro Re-education NR 25 2   Modalities     Non-Billable Service Time     Other     Total Time/Units 53 4

## 2024-02-21 ENCOUNTER — OFFICE VISIT (OUTPATIENT)
Dept: ORTHOPEDIC SURGERY | Age: 59
End: 2024-02-21

## 2024-02-21 ENCOUNTER — TREATMENT (OUTPATIENT)
Dept: PHYSICAL THERAPY | Age: 59
End: 2024-02-21
Payer: COMMERCIAL

## 2024-02-21 VITALS — WEIGHT: 299 LBS | BODY MASS INDEX: 37.18 KG/M2 | HEIGHT: 75 IN

## 2024-02-21 DIAGNOSIS — M25.551 RIGHT HIP PAIN: Primary | ICD-10-CM

## 2024-02-21 DIAGNOSIS — M25.571 RIGHT ANKLE PAIN, UNSPECIFIED CHRONICITY: ICD-10-CM

## 2024-02-21 DIAGNOSIS — S72.001A CLOSED FRACTURE DISLOCATION OF RIGHT HIP JOINT, INITIAL ENCOUNTER (HCC): Primary | ICD-10-CM

## 2024-02-21 PROCEDURE — 97112 NEUROMUSCULAR REEDUCATION: CPT | Performed by: PHYSICAL THERAPIST

## 2024-02-21 PROCEDURE — 1073RET COMPLETION OF WORKABILITY PRESCRIPTION: Performed by: STUDENT IN AN ORGANIZED HEALTH CARE EDUCATION/TRAINING PROGRAM

## 2024-02-21 PROCEDURE — 99024 POSTOP FOLLOW-UP VISIT: CPT | Performed by: STUDENT IN AN ORGANIZED HEALTH CARE EDUCATION/TRAINING PROGRAM

## 2024-02-21 RX ORDER — EMPAGLIFLOZIN 25 MG/1
25 TABLET, FILM COATED ORAL DAILY
COMMUNITY

## 2024-02-21 NOTE — PROGRESS NOTES
[facility]   Phone: [phone]   Fax: [phone]      Physical Therapy Daily Treatment Note    Date: 2024  Patient Name: Monico Gomez  : 1965   MRN: [id number]  DOInjury: 2023   DOSx: 2023  Referring Provider: Kasi Harvey DO  [address]  [address]  [place] [address]      Medical Diagnosis:   R hip pain  R ankle pain    Outcome Measure: LEFS 84% limitation    S: Pt was cleared today c progressive WB over the next 3-4 weeks to full WB. He continues to report incr in mobility and strength in the R ankle as well.     O: please refer to PT Eval  Time [phone]     Visit  Repeat outcome measure at mid point and end.    Pain 6/10     Modalities      Manual            Stretch                  Exercise      Rocker board  x30 Fwd/Lateral TE         Exercises X30 supine SLR arom  X30 supine SLR blue band  X30 side-lying hip abd  X30 side-lying clamshell blue band  X30 prone hip ext  X30 prone leg curl blue band   NR  TE   Latvian Estim X15 min 5/5 dorsiflexion NR          Performed Today     Latvian X15 min 5/5 NR   SLR flex, ext X20 ea  NR   WS on scale X5 min 70lb, 25% NR                                             A:  Tolerated well. Pt was progressed today c WB progression and neuromuscular stim to improve ankle dorsiflexion and WB tolerance. He tolerated the session well and will continue c WB up to 25% c ambulation and standing.    P: Continue with rehab plan & progress appropriately following orthopedic physician's protocol.   Rodriguez Baez PT DPT [id number]    Treatment Charges: Mins Units   Initial Evaluation     Re-Evaluation     Ther Exercise         TE     Manual Therapy     MT     Ther Activities        TA     Gait Training          GT     Neuro Re-education NR 40 3   Modalities     Non-Billable Service Time     Other     Total Time/Units 40 3

## 2024-02-21 NOTE — PROGRESS NOTES
Follow Up Post Operative Visit     Surgery: Open reduction internal fixation right acetabulum fracture  Date: 12/28/2023    Subjective:      [facility] update 2/21/2024: He is doing much better.  He has stopped taking narcotics and is not really having any pain.  His ankle dorsiflexion is improving and he is still wearing AFO.  He is progressing well with therapy.  He has been compliant with his touchdown weightbearing and posterior precautions.  We have finished DVT prophylaxis.    Monico Gomez is here for follow up visit s/p above procedure.  He is doing well.  He is having some leg pain and pain in his right ankle.  Has been compliant with his touchdown weightbearing.  He has been using Lovenox for DVT prophylaxis.  He is having some issues with pain and went to see pain management yesterday and had a lumbar spine MRI.  He actually saw one of my partners at his first postoperative visit but will be continuing to follow-up here.  Denies any incisional problems.  Denies numbness tingling.  Denies fevers and chills.  His biggest complaint is right ankle pain and decreased range of motion.      Controlled Substances Monitoring:        Physical Exam:    Height: 1.905 m (6' 3\"), Weight - Scale: 135.6 kg (299 lb)    General: Alert and oriented x3, no acute distress  Cardiovascular/pulmonary: No labored breathing, peripheral perfusion intact  Musculoskeletal:    Right hip: Incision well-approximated.  No pain with logroll.   Thigh soft compressible.  No pain with logroll of the hip.  He has 1 out of 5 strength with dorsiflexion.  He can actively dorsiflex weakly but not against resistance..  He can fire his EHL.  Sensations intact sural saphenous superficial peroneal deep peroneal tibial nerve distribution.      Imaging: AP pelvis and multiple views of the right hip demonstrate intact hardware acetabular fracture appears to have healed.  There are no complications or hardware failure.  No signs of avascular necrosis or

## 2024-02-26 ENCOUNTER — TREATMENT (OUTPATIENT)
Dept: PHYSICAL THERAPY | Age: 59
End: 2024-02-26
Payer: COMMERCIAL

## 2024-02-26 DIAGNOSIS — M25.571 RIGHT ANKLE PAIN, UNSPECIFIED CHRONICITY: ICD-10-CM

## 2024-02-26 DIAGNOSIS — M25.551 RIGHT HIP PAIN: Primary | ICD-10-CM

## 2024-02-26 PROCEDURE — 97112 NEUROMUSCULAR REEDUCATION: CPT | Performed by: PHYSICAL THERAPIST

## 2024-02-26 PROCEDURE — 97110 THERAPEUTIC EXERCISES: CPT | Performed by: PHYSICAL THERAPIST

## 2024-03-01 ENCOUNTER — TREATMENT (OUTPATIENT)
Dept: PHYSICAL THERAPY | Age: 59
End: 2024-03-01

## 2024-03-01 DIAGNOSIS — M25.571 RIGHT ANKLE PAIN, UNSPECIFIED CHRONICITY: ICD-10-CM

## 2024-03-01 DIAGNOSIS — M25.551 RIGHT HIP PAIN: Primary | ICD-10-CM

## 2024-03-01 NOTE — PROGRESS NOTES
[facility]   Phone: [phone]   Fax: [phone]      Physical Therapy Daily Treatment Note    Date: 3/1/2024  Patient Name: Monico Gomez  : 1965   MRN: [id number]  DOInjury: 2023   DOSx: 2023  Referring Provider: Kasi Harvey DO  [address]  [address]  [place] [address]      Medical Diagnosis:   R hip pain  R ankle pain    Outcome Measure: LEFS 84% limitation    S: Pt reports he is incr in WB in the R LE. He has some soreness in the hip, but is doing well overall.    O: please refer to PT Eval  Time 830-920     Visit 11 Repeat outcome measure at mid point and end.    Pain 6/10     Modalities      Manual            Stretch                  Exercise      Rocker board  x30 Fwd/Lateral TE         Exercises X30 supine SLR arom  X30 supine SLR blue band  X30 side-lying hip abd  X30 side-lying clamshell blue band  X30 prone hip ext  X30 prone leg curl blue band   NR  TE   Monegasque Estim X15 min 5/5 dorsiflexion NR          Performed Today     Seated rocker DF stretch 10x30s holds  TE   Seated Monegasque DF, Monegasque DF c stepping forward/back, step ups onto foam   X5 min ea  NR   SLR standing flex, ext X20 B  NR         PF, inv, ev X30  TE                                 A:  Tolerated well. Pt was progressed today c stability and WB tolerance needed for ADL and work activity. He tolerated the session well and is tolerating incr WB through R LE.     P: Continue with rehab plan & progress appropriately following orthopedic physician's protocol.   Rordiguez Baez PT DPT [id number]    Treatment Charges: Mins Units   Initial Evaluation     Re-Evaluation     Ther Exercise         TE 8 1   Manual Therapy     MT     Ther Activities        TA     Gait Training          GT     Neuro Re-education NR 47 3   Modalities     Non-Billable Service Time     Other     Total Time/Units 55 4

## 2024-03-04 ENCOUNTER — TREATMENT (OUTPATIENT)
Dept: PHYSICAL THERAPY | Age: 59
End: 2024-03-04
Payer: COMMERCIAL

## 2024-03-04 DIAGNOSIS — M25.571 RIGHT ANKLE PAIN, UNSPECIFIED CHRONICITY: ICD-10-CM

## 2024-03-04 DIAGNOSIS — M25.551 RIGHT HIP PAIN: Primary | ICD-10-CM

## 2024-03-04 PROCEDURE — 97530 THERAPEUTIC ACTIVITIES: CPT | Performed by: PHYSICAL THERAPIST

## 2024-03-04 PROCEDURE — 97112 NEUROMUSCULAR REEDUCATION: CPT | Performed by: PHYSICAL THERAPIST

## 2024-03-04 NOTE — PROGRESS NOTES
[facility]   Phone: [phone]   Fax: [phone]      Physical Therapy Daily Treatment Note    Date: 3/4/2024  Patient Name: Monico Gomez  : 1965   MRN: [id number]  DOInjury: 2023   DOSx: 2023  Referring Provider: Kasi Harvey DO  [address]  [address]  [place] [address]      Medical Diagnosis:   R hip pain  R ankle pain    Outcome Measure: LEFS 84% limitation    S: Pt reports that he has some feeling instability c sidelying on contralateral hip. He is progressing c WB well overall and has been utilizing compression to reduce edema distally.     O: please refer to PT Eval  Time 318-423     Visit 11 Repeat outcome measure at mid point and end.    Pain 6/10     Modalities      Manual            Stretch                  Exercise      Rocker board  x30 Fwd/Lateral TE         Exercises X30 supine SLR arom  X30 supine SLR blue band  X30 side-lying hip abd  X30 side-lying clamshell blue band  X30 prone hip ext  X30 prone leg curl blue band   NR  TE   Italian Estim X15 min 5/5 dorsiflexion NR          Performed Today     Seated rocker DF stretch 10x30s holds  TE   Seated Italian DF, marching c DF   X5 min ea  NR   SLR standing flex, abd, ext X20 B  NR   Mini squats at walker X20   TA   Gait training c WW X5 min  TA                                 A:  Tolerated well.Pt progressed today c WB progression and gait training to improve gait sequencing and ADL activity. He tolerated the session well overall c no incr in pain noted. He will obtain a WW and begin utilizing that until progression to SC as he is being limited in gait sequencing by standard walker. He is agreeable to plan.    P: Continue with rehab plan & progress appropriately following orthopedic physician's protocol.   Rodriguez Baez PT DPT [id number]    Treatment Charges: Mins Units   Initial Evaluation     Re-Evaluation     Ther Exercise         TE 5 0   Manual Therapy     MT     Ther

## 2024-03-06 ENCOUNTER — TREATMENT (OUTPATIENT)
Dept: PHYSICAL THERAPY | Age: 59
End: 2024-03-06
Payer: COMMERCIAL

## 2024-03-06 DIAGNOSIS — M25.551 RIGHT HIP PAIN: Primary | ICD-10-CM

## 2024-03-06 DIAGNOSIS — M25.571 RIGHT ANKLE PAIN, UNSPECIFIED CHRONICITY: ICD-10-CM

## 2024-03-06 PROCEDURE — 97112 NEUROMUSCULAR REEDUCATION: CPT | Performed by: PHYSICAL THERAPIST

## 2024-03-06 PROCEDURE — 97530 THERAPEUTIC ACTIVITIES: CPT | Performed by: PHYSICAL THERAPIST

## 2024-03-06 PROCEDURE — 97110 THERAPEUTIC EXERCISES: CPT | Performed by: PHYSICAL THERAPIST

## 2024-03-06 NOTE — PROGRESS NOTES
[facility]   Phone: [phone]   Fax: [phone]      Physical Therapy Daily Treatment Note    Date: 3/6/2024  Patient Name: Monico Gomez  : 1965   MRN: [id number]  DOInjury: 2023   DOSx: 2023  Referring Provider: Kasi Harvey DO  [address]  [address]  [place] [address]      Medical Diagnosis:   R hip pain  R ankle pain    Outcome Measure: LEFS 84% limitation    S: Pt reports that he returned to work and has an incr in fatigue today. He has some LBP from utilizing a WW that was too short as well.    O: please refer to PT Eval  Time 245-345     Visit 11 Repeat outcome measure at mid point and end.    Pain /10     Modalities      Manual            Stretch                  Exercise      Rocker board  x30 Fwd/Lateral TE         Exercises X30 supine SLR arom  X30 supine SLR blue band  X30 side-lying hip abd  X30 side-lying clamshell blue band  X30 prone hip ext  X30 prone leg curl blue band   NR  TE   Pakistani Estim X15 min 5/5 dorsiflexion NR          Performed Today     Seated rocker DF stretch 10x30s holds  TE   HS stretch seated X10 30s holds  TE   Standing SLR X20 B 3 direction NR   Standing hip hike X20 B  NR   TKE X20 5s holds  NR   STS X20  B HHA TA   Mini squats x20  TA                     A:  Tolerated well. He was progressed c CKC activity and hip stability today to further improve tolerance to work activity. He tolerated the session well overall c no incr in pain.    P: Continue with rehab plan & progress appropriately following orthopedic physician's protocol.   Rodriguez Baez PT DPT [id number]    Treatment Charges: Mins Units   Initial Evaluation     Re-Evaluation     Ther Exercise         TE 10 1   Manual Therapy     MT     Ther Activities        TA 15 1   Gait Training          GT     Neuro Re-education NR 35 2   Modalities     Non-Billable Service Time     Other     Total Time/Units 60 4

## 2024-03-11 ENCOUNTER — TREATMENT (OUTPATIENT)
Dept: PHYSICAL THERAPY | Age: 59
End: 2024-03-11
Payer: COMMERCIAL

## 2024-03-11 DIAGNOSIS — M25.571 RIGHT ANKLE PAIN, UNSPECIFIED CHRONICITY: ICD-10-CM

## 2024-03-11 DIAGNOSIS — M25.551 RIGHT HIP PAIN: Primary | ICD-10-CM

## 2024-03-11 PROCEDURE — 97110 THERAPEUTIC EXERCISES: CPT

## 2024-03-11 PROCEDURE — 97530 THERAPEUTIC ACTIVITIES: CPT

## 2024-03-11 PROCEDURE — 97112 NEUROMUSCULAR REEDUCATION: CPT

## 2024-03-11 NOTE — PROGRESS NOTES
[facility]   Phone: [phone]   Fax: [phone]      Physical Therapy Daily Treatment Note    Date: 3/11/2024  Patient Name: Monico Gomez  : 1965   MRN: [id number]  DOInjury: 2023   DOSx: 2023  Referring Provider: No referring provider defined for this encounter.      Medical Diagnosis:   R hip pain  R ankle pain    Outcome Measure: LEFS 84% limitation    S: Patient reports some low back pain. Patient Is ambulating well into clinic c walker.    O: please refer to PT Eval  Time 304-404     Visit 12 Repeat outcome measure at mid point and end.    Pain 6/10     Modalities      Manual            Stretch                  Exercise      Rocker board  x30 Fwd/Lateral TE         Exercises X30 supine SLR arom  X30 supine SLR blue band  X30 side-lying hip abd  X30 side-lying clamshell blue band  X30 prone hip ext  X30 prone leg curl blue band   NR  TE   Kyrgyz Estim X15 min 5/5 dorsiflexion NR          Performed Today           Seated rocker DF stretch 10x30s holds  TE   HS stretch seated X10 30s holds  TE   Standing SLR X30 B Ankle weights  NR   Standing hip hike X20 B  NR   TKE X20 5s holds  NR   STS X20  B HHA TA   Mini squats x20  TA   Standing in walking incline stretch                  A:  Tolerated well. He was progressed c CKC activity and hip stability today to further improve tolerance to work activity. He tolerated the session well overall c no incr in pain. Continue c stair training once full weight bearing.    P: Continue with rehab plan & progress appropriately following orthopedic physician's protocol.   Nora Flores [facility] [id number]    Treatment Charges: Mins Units   Initial Evaluation     Re-Evaluation     Ther Exercise         TE 10 1   Manual Therapy     MT     Ther Activities        TA 15 1   Gait Training          GT     Neuro Re-education NR 35 2   Modalities     Non-Billable Service Time     Other     Total Time/Units 60 4

## 2024-03-13 ENCOUNTER — TREATMENT (OUTPATIENT)
Dept: PHYSICAL THERAPY | Age: 59
End: 2024-03-13
Payer: COMMERCIAL

## 2024-03-13 DIAGNOSIS — M25.571 RIGHT ANKLE PAIN, UNSPECIFIED CHRONICITY: ICD-10-CM

## 2024-03-13 DIAGNOSIS — M25.551 RIGHT HIP PAIN: Primary | ICD-10-CM

## 2024-03-13 PROCEDURE — 97530 THERAPEUTIC ACTIVITIES: CPT | Performed by: PHYSICAL THERAPIST

## 2024-03-13 PROCEDURE — 97112 NEUROMUSCULAR REEDUCATION: CPT | Performed by: PHYSICAL THERAPIST

## 2024-03-13 NOTE — PROGRESS NOTES
[facility]   Phone: [phone]   Fax: [phone]      Physical Therapy Daily Treatment Note    Date: 3/12/2024  Patient Name: Monico Gomez  : 1965   MRN: [id number]  DOInjury: 2023   DOSx: 2023  Referring Provider: Kasi Harvey DO  [address]  [address]  [place] [address]      Medical Diagnosis:   R hip pain  R ankle pain    Outcome Measure: LEFS 84% limitation    S: Pt reports that he has been ambulating c SC short distance at home. He is noting lack of balance secondary to weakness in the hip and ankle.    O: please refer to PT Eval  Time 343-423     Visit 13 Repeat outcome measure at mid point and end.    Pain 6/10     Modalities      Manual            Stretch                  Exercise      Rocker board  x30 Fwd/Lateral TE         Exercises X30 supine SLR arom  X30 supine SLR blue band  X30 side-lying hip abd  X30 side-lying clamshell blue band  X30 prone hip ext  X30 prone leg curl blue band   NR  TE   Marshallese Estim X15 min 5/5 dorsiflexion NR          Performed Today                 marching X30 B 2 lb NR   Standing SLR X30 B Ankle weights  NR   Standing hip hike X20 B  NR   B HS curl X30 B  NR   STS 3x10 B HHA TA   Mini squats X30 B 2 lb TA   Gait training c SC X10 min  TA               A:  Tolerated well. Pt was progressed today c CKC stability c SC to improve tolerance to ADL activity. He was cleared to begin using SC short distances at work and in home.    P: Continue with rehab plan & progress appropriately following orthopedic physician's protocol.   Rodriguez Baez PT DPT [id number]    Treatment Charges: Mins Units   Initial Evaluation     Re-Evaluation     Ther Exercise         TE     Manual Therapy     MT     Ther Activities        TA 15 1   Gait Training          GT     Neuro Re-education NR 25 2   Modalities     Non-Billable Service Time     Other     Total Time/Units 40 3

## 2024-03-18 ENCOUNTER — TREATMENT (OUTPATIENT)
Dept: PHYSICAL THERAPY | Age: 59
End: 2024-03-18

## 2024-03-18 DIAGNOSIS — M25.551 RIGHT HIP PAIN: Primary | ICD-10-CM

## 2024-03-18 DIAGNOSIS — M25.571 RIGHT ANKLE PAIN, UNSPECIFIED CHRONICITY: ICD-10-CM

## 2024-03-18 NOTE — PROGRESS NOTES
[facility]   Phone: [phone]   Fax: [phone]      Physical Therapy Daily Treatment Note    Date: 3/18/2024  Patient Name: Monico Gomez  : 1965   MRN: [id number]  DOInjury: 2023   DOSx: 2023  Referring Provider: Kasi Harvey DO  [address]  [address]  [place] [address]      Medical Diagnosis:   R hip pain  R ankle pain    Outcome Measure: LEFS 84% limitation    S: Pt reports that he had issues c gait over the weekend and has some knee pain d/t short stepping.    O: please refer to PT Eval  Time 425-505     Visit 14 Repeat outcome measure at mid point and end.    Pain 6/10     Modalities      Manual            Stretch                  Exercise      Rocker board  x30 Fwd/Lateral TE         Exercises X30 supine SLR arom  X30 supine SLR blue band  X30 side-lying hip abd  X30 side-lying clamshell blue band  X30 prone hip ext  X30 prone leg curl blue band   NR  TE   Swedish Estim X15 min 5/5 dorsiflexion NR          Performed Today     BB calf stretch X20 10s holds   TE   Hip flexor stretch 4x30s holds  TE   Hip ER stretch 4x30s holds  TE   Gait training X10 min C SC TA   SL clams, abd X20 ea  TE                                       A:  Tolerated well. Pt was progressed today c stability and strength needed for ADL and work activity. He was trained in proper gait sequencing to improve gait.     P: Continue with rehab plan & progress appropriately following orthopedic physician's protocol.   Rodriguez Baez PT DPT [id number]    Treatment Charges: Mins Units   Initial Evaluation     Re-Evaluation     Ther Exercise         TE 30 2   Manual Therapy     MT     Ther Activities        TA 10 1   Gait Training          GT     Neuro Re-education NR     Modalities     Non-Billable Service Time     Other     Total Time/Units 40 3

## 2024-03-20 ENCOUNTER — TREATMENT (OUTPATIENT)
Dept: PHYSICAL THERAPY | Age: 59
End: 2024-03-20

## 2024-03-20 DIAGNOSIS — M25.551 RIGHT HIP PAIN: Primary | ICD-10-CM

## 2024-03-20 DIAGNOSIS — M25.571 RIGHT ANKLE PAIN, UNSPECIFIED CHRONICITY: ICD-10-CM

## 2024-03-20 NOTE — PROGRESS NOTES
[facility]   Phone: [phone]   Fax: [phone]      Physical Therapy Daily Treatment Note    Date: 3/20/2024  Patient Name: Monico Gomez  : 1965   MRN: [id number]  DOInjury: 2023   DOSx: 2023  Referring Provider: Kasi Harvey DO  [address]  [address]  [place] [address]      Medical Diagnosis:   R hip pain  R ankle pain    Outcome Measure: LEFS 84% limitation    S: Pt reports that he is moving better today and stretching is helping reduce stiffness in anterior hip.    O: please refer to PT Eval  Time 425-505     Visit 15 Repeat outcome measure at mid point and end.    Pain 6/10     Modalities      Manual            Stretch                  Exercise      Rocker board  x30 Fwd/Lateral TE         Exercises X30 supine SLR arom  X30 supine SLR blue band  X30 side-lying hip abd  X30 side-lying clamshell blue band  X30 prone hip ext  X30 prone leg curl blue band   NR  TE   Bahraini Estim X15 min 5/5 dorsiflexion NR          Performed Today           Hip flexor stretch 4x30s holds  TE   Hip ER stretch 4x30s holds  TE   Bridging, c hip abd, c hip add X20 5s holds  NR   Step ups  X20 R Forward, lateral TA                                       A:  Tolerated well. Pt was progressed today c functional mobility and stability needed for ADL activity. He is progressing c gait and CKC stability and is utilizing SC most time now. He has appt c surgeon tomorrow and will call following this to schedule additional sessions.    P: Continue with rehab plan & progress appropriately following orthopedic physician's protocol.   Rodriguez Baez PT DPT [id number]    Treatment Charges: Mins Units   Initial Evaluation     Re-Evaluation     Ther Exercise         TE 15 1   Manual Therapy     MT     Ther Activities        TA 10 1   Gait Training          GT     Neuro Re-education NR 15 1   Modalities     Non-Billable Service Time     Other     Total Time/Units 40 3

## 2024-03-21 ENCOUNTER — OFFICE VISIT (OUTPATIENT)
Dept: ORTHOPEDIC SURGERY | Age: 59
End: 2024-03-21
Payer: COMMERCIAL

## 2024-03-21 DIAGNOSIS — S72.001A CLOSED FRACTURE DISLOCATION OF RIGHT HIP JOINT, INITIAL ENCOUNTER (HCC): Primary | ICD-10-CM

## 2024-03-21 PROCEDURE — 99213 OFFICE O/P EST LOW 20 MIN: CPT | Performed by: STUDENT IN AN ORGANIZED HEALTH CARE EDUCATION/TRAINING PROGRAM

## 2024-03-21 PROCEDURE — 1073RET COMPLETION OF WORKABILITY PRESCRIPTION: Performed by: STUDENT IN AN ORGANIZED HEALTH CARE EDUCATION/TRAINING PROGRAM

## 2024-03-21 NOTE — PROGRESS NOTES
foot from the ground when ambulating..  Sensations intact sural saphenous superficial peroneal deep peroneal tibial nerve distribution.      Imaging: AP pelvis and multiple views of the right hip demonstrate intact hardware acetabular fracture appears to have healed.  There are no complications or hardware failure.  No signs of avascular necrosis or posttraumatic arthritis.  There is some heterotopic ossification off the posterior superior acetabulum which is not bridging at this point.  No advancement of his heterotopic ossification noted.  Fracture appears united    Assessment and Plan: 4 months status post open reduction internal fixation right acetabulum fracture    -Weightbearing as tolerated.  Cane for assist device.  He does not want to wear his phone anymore and he is not having trouble clearing his foot from the ground with ambulation.  His foot drop.  To be improving.  He would like to continue working half days for 2 more weeks and then return to work without restriction in 2 weeks.  I will see him back in 2 months to repeat imaging.  We did discuss heterotopic ossification how this can advance.  We also discussed the high risk of posttraumatic arthritis down the road.  He is happy with his treatment so far.          Kasi Harvey DO   Orthopaedic Surgery   3/21/24  11:10 AM    Note: This report was completed using [employer] voiced recognition software.  Every effort has been made to ensure accuracy; however, inadvertent computerized transcription errors may be present.

## 2024-03-25 ENCOUNTER — TREATMENT (OUTPATIENT)
Dept: PHYSICAL THERAPY | Age: 59
End: 2024-03-25
Payer: COMMERCIAL

## 2024-03-25 DIAGNOSIS — M25.551 RIGHT HIP PAIN: Primary | ICD-10-CM

## 2024-03-25 DIAGNOSIS — M25.571 RIGHT ANKLE PAIN, UNSPECIFIED CHRONICITY: ICD-10-CM

## 2024-03-25 PROCEDURE — 97112 NEUROMUSCULAR REEDUCATION: CPT | Performed by: PHYSICAL THERAPIST

## 2024-03-25 PROCEDURE — 97530 THERAPEUTIC ACTIVITIES: CPT | Performed by: PHYSICAL THERAPIST

## 2024-03-25 NOTE — PROGRESS NOTES
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 He feels his gait is improving overall.    O: please refer to PT Eval  Time 340-420     Visit 16 Repeat outcome measure at mid point and end.    Pain 6/10     Modalities      Manual            Stretch                  Exercise      Rocker board  x30 Fwd/Lateral TE         Exercises X30 supine SLR arom  X30 supine SLR blue band  X30 side-lying hip abd  X30 side-lying clamshell blue band  X30 prone hip ext  X30 prone leg curl blue band   NR  TE   Kosovan Estim X15 min 5/5 dorsiflexion NR          Performed Today     Foam ext, abd, flex X20 B  NR   Foam march X20 B  NR   STS x30  TA   squats x20  TA   Step ups  X20 R Forward, lateral TA                                       A:  Tolerated well. Pt was progressed today c CKC activity to improve tolerance to ADL activity. He tolerated the session well overall c no incr in pain. He is able to ambulate s AD short distances, but has moderate antalgic gait secondary to hip weakness..    P: Continue with rehab plan & progress appropriately following orthopedic physician's protocol.   Rodriguez Baez PT DPT [id number]    Treatment Charges: Mins Units   Initial Evaluation     Re-Evaluation     Ther Exercise         TE     Manual Therapy     MT     Ther Activities        TA 25 2   Gait Training          GT     Neuro Re-education NR 15 1   Modalities     Non-Billable Service Time     Other     Total Time/Units 40 3

## 2024-04-01 ENCOUNTER — TREATMENT (OUTPATIENT)
Dept: PHYSICAL THERAPY | Age: 59
End: 2024-04-01
Payer: COMMERCIAL

## 2024-04-01 DIAGNOSIS — M25.551 RIGHT HIP PAIN: Primary | ICD-10-CM

## 2024-04-01 DIAGNOSIS — M25.571 RIGHT ANKLE PAIN, UNSPECIFIED CHRONICITY: ICD-10-CM

## 2024-04-01 PROCEDURE — 97530 THERAPEUTIC ACTIVITIES: CPT

## 2024-04-01 PROCEDURE — 97112 NEUROMUSCULAR REEDUCATION: CPT

## 2024-04-01 NOTE — PROGRESS NOTES
[facility]   Phone: [phone]   Fax: [phone]      Physical Therapy Daily Treatment Note    Date: 2024  Patient Name: Monico Gomez  : 1965   MRN: [id number]  DOInjury: 2023   DOSx: 2023  Referring Provider: Kasi Harvey DO  [address]  [address]  [place] [address]      Medical Diagnosis:   R hip pain  R ankle pain    Outcome Measure: LEFS 84% limitation    S:  Patient reports he feels that his ambulation is improving. He still feels that he is struggling c DF during gait.    O: please refer to PT Eval  Time 340-425     Visit 17 Repeat outcome measure at mid point and end.    Pain 6/10     Modalities      Manual            Stretch                  Exercise      Rocker board  x30 Fwd/Lateral TE         Exercises X30 supine SLR arom  X30 supine SLR blue band  X30 side-lying hip abd  X30 side-lying clamshell blue band  X30 prone hip ext  X30 prone leg curl blue band   NR  TE   Puerto Rican Estim X15 min 5/5 dorsiflexion NR          Performed Today            NR    NR   STS x30  TA   squats x20  TA   Step ups  X20 R Forward, lateral TA   Resisted SLRs X30  BLE GTB NR   Cone taps - 3 Points  x30  NR   Cone step overs -Fwd/Lat X5 cones each direction 4 Laps  NR   Toe taps on 6\" step x30  NR          A:  Tolerated well. Patient was progressed this tx session to consist of balance and coordination. Focused on improving DF with ambulation. Patient exhibits moderate mm fatigue post tx session but demonstrated improved gait mechanics.    P: Continue with rehab plan & progress appropriately following orthopedic physician's protocol.   Nora Flores PTA [id number]    Treatment Charges: Mins Units   Initial Evaluation     Re-Evaluation     Ther Exercise         TE     Manual Therapy     MT     Ther Activities        TA 15 1   Gait Training          GT     Neuro Re-education NR 30 2   Modalities     Non-Billable Service Time     Other     Total Time/Units

## 2024-04-03 ENCOUNTER — TREATMENT (OUTPATIENT)
Dept: PHYSICAL THERAPY | Age: 59
End: 2024-04-03
Payer: COMMERCIAL

## 2024-04-03 DIAGNOSIS — M25.571 RIGHT ANKLE PAIN, UNSPECIFIED CHRONICITY: ICD-10-CM

## 2024-04-03 DIAGNOSIS — M25.551 RIGHT HIP PAIN: Primary | ICD-10-CM

## 2024-04-03 PROCEDURE — 97530 THERAPEUTIC ACTIVITIES: CPT

## 2024-04-03 PROCEDURE — 97112 NEUROMUSCULAR REEDUCATION: CPT

## 2024-04-12 RX ORDER — SULFAMETHOXAZOLE AND TRIMETHOPRIM 800; 160 MG/1; MG/1
1 TABLET ORAL 2 TIMES DAILY
Qty: 14 TABLET | Refills: 0 | Status: SHIPPED | OUTPATIENT
Start: 2024-04-12 | End: 2024-04-19

## 2024-04-12 NOTE — PROGRESS NOTES
Patient's wife called with concern of lower leg wound that started about a month ago due to his AFO brace.  His PCP provided doxycycline for 1 week and a topical antibiotic ointment where he showed mild improvement. PCP provided an additional 3 days of doxycycline per wife. She is very concerned about this being a MRSA infection and that it could harm her . She states there is redness/rash surrounding wound, no swelling or drainage. Advised wife to provide photo of wound for further evaluation.     Spoke with patient, Monico, after photo received. He denies fever or chills. He states he is not wearing the AFO brace anymore due to this wound. We discussed the importance of keeping the wound clean and dry, he can continue doxycycline as prescribed along with topical antibiotic ointment. I will send in a prescription for Bactrim in addition to his doxycycline. He is to follow up with his PCP on 4/15/2024. If wound does not improve within 2 weeks to please call our office to setup an appointment for wound check. He is understanding of this. All questions and concerns were answered in detail.

## 2024-04-15 ENCOUNTER — TREATMENT (OUTPATIENT)
Dept: PHYSICAL THERAPY | Age: 59
End: 2024-04-15
Payer: COMMERCIAL

## 2024-04-15 DIAGNOSIS — M25.551 RIGHT HIP PAIN: Primary | ICD-10-CM

## 2024-04-15 DIAGNOSIS — M25.571 RIGHT ANKLE PAIN, UNSPECIFIED CHRONICITY: ICD-10-CM

## 2024-04-15 PROCEDURE — 97530 THERAPEUTIC ACTIVITIES: CPT

## 2024-04-15 PROCEDURE — 97112 NEUROMUSCULAR REEDUCATION: CPT

## 2024-04-15 NOTE — PROGRESS NOTES
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     O: please refer to PT Eval  Time 500-540     Visit 19 Repeat outcome measure at mid point and end.    Pain 6/10     Modalities      Manual            Stretch                  Exercise      Rocker board  x30 Fwd/Lateral TE         Exercises X30 supine SLR arom  X30 supine SLR blue band  X30 side-lying hip abd  X30 side-lying clamshell blue band  X30 prone hip ext  X30 prone leg curl blue band   NR  TE   Slovak Estim X15 min 5/5 dorsiflexion NR          Performed Today            NR   Foam march 2 min   NR   STS x30  TA   squats x20  TA   Step ups - 8\" X20 R Forward, lateral TA   Resisted SLRs X30 RLE  3 lbs  NR    NR   4 Laps  NR   Toe taps on 8\" step x30  NR    Resisted ambulation c stick  X20 reps  RXB- All Directions  NR                     A:  Tolerated well. Continued to focus on improving CKC strength. He demonstrates improved gait mechanics and is able to ambulate short distances without use of cane. Continue to further improve LE strength/stability needed to further reduce limitation c ambulation.    P: Continue with rehab plan & progress appropriately following orthopedic physician's protocol.   Nora Flores PTA [id number]    Treatment Charges: Mins Units   Initial Evaluation     Re-Evaluation     Ther Exercise         TE     Manual Therapy     MT     Ther Activities        TA 15 1   Gait Training          GT     Neuro Re-education NR 25 2   Modalities     Non-Billable

## 2024-04-16 ENCOUNTER — TELEPHONE (OUTPATIENT)
Dept: ORTHOPEDIC SURGERY | Age: 59
End: 2024-04-16

## 2024-04-16 NOTE — TELEPHONE ENCOUNTER
Patient sent new picture of leg wound after starting Bactrim. PCP applied Zinc Oxide wrap over wound on 4/15. Patient states he will return Wednesday for follow up and new dressing. Will allow PCP to take over at this point and advise patient to please setup follow up with Dr. Harvey for his 6 month follow up and repeat imaging. If he has any other issues or concerns to please reach out.

## 2024-04-22 ENCOUNTER — TREATMENT (OUTPATIENT)
Dept: PHYSICAL THERAPY | Age: 59
End: 2024-04-22
Payer: COMMERCIAL

## 2024-04-22 DIAGNOSIS — M25.571 RIGHT ANKLE PAIN, UNSPECIFIED CHRONICITY: ICD-10-CM

## 2024-04-22 DIAGNOSIS — M25.551 RIGHT HIP PAIN: Primary | ICD-10-CM

## 2024-04-22 PROCEDURE — 97112 NEUROMUSCULAR REEDUCATION: CPT

## 2024-04-22 PROCEDURE — 97530 THERAPEUTIC ACTIVITIES: CPT

## 2024-04-22 NOTE — PROGRESS NOTES
[facility]   Phone: [phone]   Fax: [phone]      Physical Therapy Daily Treatment Note    Date: 2024  Patient Name: Monico Gomez  : 1965   MRN: [id number]  DOInjury: 2023   DOSx: 2023  Referring Provider: Kasi Harvey DO  [address]  [address]  [place] [address]      Medical Diagnosis:   R hip pain  R ankle pain    Outcome Measure: LEFS 84% limitation    S:  Patient reports to therapy and demonstrates improved ambulation.     O: please refer to PT Eval  Time 440-425     Visit 20 Repeat outcome measure at mid point and end.    Pain 6/10     Modalities      Manual            Stretch                  Exercise      Rocker board  x30 Fwd/Lateral TE         Exercises  X30 supine SLR arom  X30 supine SLR blue band  X30 side-lying hip abd  X30 side-lying clamshell blue band  X30 prone hip ext  X30 prone leg curl blue band   NR  TE   Georgian Estim X15 min 5/5 dorsiflexion NR          Performed Today            NR    NR   STS x30 C no hands  TA   squats x30  TA   Step ups - 8\" X30 R Forward, lateral TA   Resisted SLRs X30 BLE  Blue band  NR    NR   4 Laps  NR   Toe taps on 8\" step x30  NR    Resisted ambulation c stick  X20 reps  BXB- All Directions  NR                     A:  Tolerated well. Continued to focus on improving CKC strength. He demonstrates improved gait mechanics and is able to ambulate short distances without use of cane. Demonstrates improved control c resisted ambulation this tx session. Continue to further improve LE strength/stability needed to further reduce limitation c ambulation.    P: Continue with rehab plan & progress appropriately following orthopedic physician's protocol.   Nora Flores PTA [id number]    Treatment Charges: Mins Units   Initial Evaluation     Re-Evaluation     Ther Exercise         TE     Manual Therapy     MT     Ther Activities        TA 15 1   Gait Training          GT     Neuro Re-education NR

## 2024-04-29 ENCOUNTER — TREATMENT (OUTPATIENT)
Dept: PHYSICAL THERAPY | Age: 59
End: 2024-04-29
Payer: COMMERCIAL

## 2024-04-29 ENCOUNTER — PATIENT MESSAGE (OUTPATIENT)
Dept: ORTHOPEDIC SURGERY | Age: 59
End: 2024-04-29

## 2024-04-29 DIAGNOSIS — M25.571 RIGHT ANKLE PAIN, UNSPECIFIED CHRONICITY: ICD-10-CM

## 2024-04-29 DIAGNOSIS — M25.551 RIGHT HIP PAIN: Primary | ICD-10-CM

## 2024-04-29 PROCEDURE — 97112 NEUROMUSCULAR REEDUCATION: CPT

## 2024-04-29 NOTE — PROGRESS NOTES
[facility]   Phone: [phone]   Fax: [phone]      Physical Therapy Daily Treatment Note    Date: 2024  Patient Name: Monico Gomez  : 1965   MRN: [id number]  DOInjury: 2023   DOSx: 2023  Referring Provider: Kasi Harvey DO  [address]  [address]  [place] [address]      Medical Diagnosis:   R hip pain  R ankle pain    Outcome Measure: LEFS 84% limitation    S:  Patient reports to therapy and demonstrates improved ambulation.     O: please refer to PT Eval  Time [phone]     Visit 21 Repeat outcome measure at mid point and end.    Pain 6/10     Modalities      Manual            Stretch                  Exercise      Rocker board  x30 Fwd/Lateral TE         Exercises  X30 supine SLR arom  X30 supine SLR blue band  X30 side-lying hip abd  X30 side-lying clamshell blue band  X30 prone hip ext  X30 prone leg curl blue band   NR  TE   Chadian Estim X15 min 5/5 dorsiflexion NR          Performed Today            NR    NR   C no hands  TA    TA   Forward, lateral TA   Resisted SLRs X30 BLE  Blue band  NR   Resisted Marches  x30 Blue band  NR        NR   4 Laps  NR   Toe taps on 8\" step x30  NR    Resisted ambulation c stick  X20 reps  Purple - All Directions  NR   Resisted step-ups 4\" step  X30  Fwd/lat  NR   Step ups into SLS RLE  X30  Fwd/Lat NR   Marching on foam  2 min   NR                     A:  Tolerated well. Continued to focus on improving CKC strength. He demonstrates improved gait mechanics and is able to ambulate short distances without use of cane. Demonstrates improved control c resisted ambulation this tx session. Continue to further improve LE strength/stability needed to further reduce limitation c ambulation.    P: Continue with rehab plan & progress appropriately following orthopedic physician's protocol.   Nora Flores PTA [id number]    Treatment Charges: Mins Units   Initial Evaluation     Re-Evaluation     Ther Exercise

## 2024-04-30 ENCOUNTER — TELEPHONE (OUTPATIENT)
Dept: ORTHOPEDIC SURGERY | Age: 59
End: 2024-04-30

## 2024-04-30 DIAGNOSIS — S72.001D CLOSED FRACTURE DISLOCATION OF RIGHT HIP JOINT WITH ROUTINE HEALING, SUBSEQUENT ENCOUNTER: Primary | ICD-10-CM

## 2024-04-30 NOTE — TELEPHONE ENCOUNTER
----- Message from Monico Gomez sent at 4/30/2024  7:51 AM EDT -----  Regarding: Physical therapy   Contact: [phone]

## 2024-04-30 NOTE — TELEPHONE ENCOUNTER
Patient asking for another PT [facility]    His PT is recommending he have more sessions    Will fax new order to [phone]

## 2024-05-08 ENCOUNTER — HOSPITAL ENCOUNTER (OUTPATIENT)
Dept: PHYSICAL THERAPY | Age: 59
Setting detail: THERAPIES SERIES
Discharge: HOME | End: 2024-05-08
Payer: COMMERCIAL

## 2024-05-08 PROCEDURE — 97530 THERAPEUTIC ACTIVITIES: CPT | Performed by: PHYSICAL THERAPIST

## 2024-05-08 PROCEDURE — 97112 NEUROMUSCULAR REEDUCATION: CPT | Performed by: PHYSICAL THERAPIST

## 2024-05-08 NOTE — PROGRESS NOTES
Physical Therapy Daily Treatment Note     Date: 2024  Patient Name: Monico Gomez  : 1965              MRN: [id number]  DOInjury: 2023   DOSx: 2023  Referring Provider: aKsi Harvey DO  [address]  [address]  [place] [address]                                 Medical Diagnosis:   R hip pain  R ankle pain     Outcome Measure: LEFS 84% limitation     S:  Pt reports that he is doing better overall but still requires AD for ambulation. [facility] following [facility] for [facility] purposes.     O: please refer to PT Eval  Time [phone]       Visit 22 Repeat outcome measure at mid point and end.     Pain 6/10       Modalities         Manual                   Stretch                             Exercise             Rocker board  x30 Fwd/Lateral TE                 Exercises  X30 supine SLR arom  X30 supine SLR blue band  X30 side-lying hip abd  X30 side-lying clamshell blue band  X30 prone hip ext  X30 prone leg curl blue band    NR  TE   Sudanese Estim X15 min 5/5 dorsiflexion NR                   Performed Today                 Resisted stepping X20 B 3 direction TA   Standing SLR X20 B 3 direction NR   Hip hikes X20 5s holds  NR   Step ups forward X20   8 inch TA   Heel taps forward X20 B  TA                                                                                             A:  Tolerated well. CKC activity performed today to improve tolerance to ADL activity. Tolerated the session well but continues to display trendelenburg gait d/t lateral hip weakness.      P: Continue with rehab plan & progress appropriately following orthopedic physician's protocol.   Rodriguze Baez PT DPT [id number]      Treatment Charges: Mins Units   Initial Evaluation       Re-Evaluation       Ther Exercise         TE       Manual Therapy     MT       Ther Activities        TA 15 1   Gait Training          GT       Neuro Re-education NR 15 1   Modalities       Non-Billable Service

## 2024-05-13 ENCOUNTER — HOSPITAL ENCOUNTER (OUTPATIENT)
Dept: PHYSICAL THERAPY | Age: 59
Setting detail: THERAPIES SERIES
Discharge: HOME | End: 2024-05-13
Payer: COMMERCIAL

## 2024-05-13 DIAGNOSIS — M25.551 RIGHT HIP PAIN: Primary | ICD-10-CM

## 2024-05-13 PROCEDURE — 97530 THERAPEUTIC ACTIVITIES: CPT | Performed by: PHYSICAL THERAPIST

## 2024-05-13 PROCEDURE — 97112 NEUROMUSCULAR REEDUCATION: CPT | Performed by: PHYSICAL THERAPIST

## 2024-05-13 NOTE — PROGRESS NOTES
Physical Therapy Daily Treatment Note     Date: 2024  Patient Name: Monico Gomez  : 1965              MRN: [id number]  DOInjury: 2023   DOSx: 2023  Referring Provider: Kasi Harvey DO  [address]  [address]  [place] [address]                                 Medical Diagnosis:   R hip pain  R ankle pain     Outcome Measure: LEFS 84% limitation     S:  Pt reports that he is able to ambulate appr 1/2 mile at work before major fatigue and gait deviations. Today is final visit.      O: please refer to PT Eval  Time 455-535       Visit 23 Repeat outcome measure at mid point and end.     Pain 6/10       Modalities         Manual                   Stretch                             Exercise             Rocker board  x30 Fwd/Lateral TE                 Exercises  X30 supine SLR arom  X30 supine SLR blue band  X30 side-lying hip abd  X30 side-lying clamshell blue band  X30 prone hip ext  X30 prone leg curl blue band    NR  TE   Slovak Estim X15 min 5/5 dorsiflexion NR                   Performed Today       ambulation X30 min   TA               Hip hikes X20 5s holds  NR                                                                                                         A:  Tolerated well. Pt was progressed today c long walk s AD to determine capacity overall. He was able to achieve appr 3/4 of a mile s AD before requiring rest break. He will d/c from PT services today and resume [facility].      P: d/c at this time.  Rodriguez Baez PT DPT [id number]      Treatment Charges: Mins Units   Initial Evaluation       Re-Evaluation       Ther Exercise         TE       Manual Therapy     MT       Ther Activities        TA 30 2   Gait Training          GT       Neuro Re-education NR 10 1   Modalities       Non-Billable Service Time       Other       Total Time/Units 40 3

## 2024-05-23 ENCOUNTER — OFFICE VISIT (OUTPATIENT)
Dept: ORTHOPEDIC SURGERY | Age: 59
End: 2024-05-23
Payer: COMMERCIAL

## 2024-05-23 DIAGNOSIS — S32.401D OPEN NONDISPLACED FRACTURE OF RIGHT ACETABULUM WITH ROUTINE HEALING, UNSPECIFIED PORTION OF ACETABULUM, SUBSEQUENT ENCOUNTER: Primary | ICD-10-CM

## 2024-05-23 PROCEDURE — 99213 OFFICE O/P EST LOW 20 MIN: CPT | Performed by: STUDENT IN AN ORGANIZED HEALTH CARE EDUCATION/TRAINING PROGRAM

## 2024-05-23 RX ORDER — SILDENAFIL 100 MG/1
100 TABLET, FILM COATED ORAL
COMMUNITY
Start: 2023-06-15

## 2024-05-23 RX ORDER — SEMAGLUTIDE 1.34 MG/ML
INJECTION, SOLUTION SUBCUTANEOUS
COMMUNITY

## 2024-05-23 RX ORDER — METFORMIN HYDROCHLORIDE 500 MG/1
1000 TABLET, EXTENDED RELEASE ORAL 2 TIMES DAILY
COMMUNITY

## 2024-05-23 NOTE — PROGRESS NOTES
Follow Up Post Operative Visit     Surgery: Open reduction internal fixation right acetabulum fracture  Date: 11/28/2023    Subjective:    HPI update 5/23/2024:.  He is doing very well.  He states he has pain for about the first 10-15 steps after getting started but after that it resolved.  He is back to work.  He is happy with his result so far.    HPI update 3/21/2024: He is doing very well.  He does complain of some lateral sided pain and weakness in his hip.  He has been ambulating with a cane weightbearing as tolerated.  He has returned to work half days and is doing very well.  He is happy with his result so far.  He has no groin pain.      HPI update 2/21/2024: He is doing much better.  He has stopped taking narcotics and is not really having any pain.  His ankle dorsiflexion is improving and he is still wearing AFO.  He is progressing well with therapy.  He has been compliant with his touchdown weightbearing and posterior precautions.  We have finished DVT prophylaxis.    Monico Gomez is here for follow up visit s/p above procedure.  He is doing well.  He is having some leg pain and pain in his right ankle.  Has been compliant with his touchdown weightbearing.  He has been using Lovenox for DVT prophylaxis.  He is having some issues with pain and went to see pain management yesterday and had a lumbar spine MRI.  He actually saw one of my partners at his first postoperative visit but will be continuing to follow-up here.  Denies any incisional problems.  Denies numbness tingling.  Denies fevers and chills.  His biggest complaint is right ankle pain and decreased range of motion.      Controlled Substances Monitoring:        Physical Exam:    No data recorded    General: Alert and oriented x3, no acute distress  Cardiovascular/pulmonary: No labored breathing, peripheral perfusion intact  Musculoskeletal:    Right hip: Incision well-approximated.  No pain with logroll.  No pain with flexion

## 2024-10-09 ENCOUNTER — HOSPITAL ENCOUNTER (OUTPATIENT)
Age: 59
Discharge: HOME | End: 2024-10-11

## 2024-10-09 PROCEDURE — 88305 TISSUE EXAM BY PATHOLOGIST: CPT

## 2024-10-11 LAB — SURGICAL PATHOLOGY REPORT: NORMAL

## 2024-11-21 ENCOUNTER — OFFICE VISIT (OUTPATIENT)
Dept: ORTHOPEDIC SURGERY | Age: 59
End: 2024-11-21
Payer: COMMERCIAL

## 2024-11-21 VITALS
HEART RATE: 88 BPM | DIASTOLIC BLOOD PRESSURE: 82 MMHG | RESPIRATION RATE: 20 BRPM | SYSTOLIC BLOOD PRESSURE: 125 MMHG | OXYGEN SATURATION: 97 % | TEMPERATURE: 97.8 F

## 2024-11-21 DIAGNOSIS — S32.401D OPEN NONDISPLACED FRACTURE OF RIGHT ACETABULUM WITH ROUTINE HEALING, UNSPECIFIED PORTION OF ACETABULUM, SUBSEQUENT ENCOUNTER: Primary | ICD-10-CM

## 2024-11-21 PROCEDURE — 99213 OFFICE O/P EST LOW 20 MIN: CPT | Performed by: STUDENT IN AN ORGANIZED HEALTH CARE EDUCATION/TRAINING PROGRAM

## 2024-11-21 NOTE — PROGRESS NOTES
Follow Up Post Operative Visit     Surgery: Open reduction internal fixation right acetabulum fracture  Date: 11/28/2023    Subjective:    HPI update 11/21/2024: He is doing very well.  He is returned to work.  He is also returned to hunting.  He has some intermittent groin pain usually after using the elliptical but overall he is very happy with his results.    HPI update 5/23/2024:.  He is doing very well.  He states he has pain for about the first 10-15 steps after getting started but after that it resolved.  He is back to work.  He is happy with his result so far.    HPI update 3/21/2024: He is doing very well.  He does complain of some lateral sided pain and weakness in his hip.  He has been ambulating with a cane weightbearing as tolerated.  He has returned to work half days and is doing very well.  He is happy with his result so far.  He has no groin pain.      HPI update 2/21/2024: He is doing much better.  He has stopped taking narcotics and is not really having any pain.  His ankle dorsiflexion is improving and he is still wearing AFO.  He is progressing well with therapy.  He has been compliant with his touchdown weightbearing and posterior precautions.  We have finished DVT prophylaxis.    Monico Gomez is here for follow up visit s/p above procedure.  He is doing well.  He is having some leg pain and pain in his right ankle.  Has been compliant with his touchdown weightbearing.  He has been using Lovenox for DVT prophylaxis.  He is having some issues with pain and went to see pain management yesterday and had a lumbar spine MRI.  He actually saw one of my partners at his first postoperative visit but will be continuing to follow-up here.  Denies any incisional problems.  Denies numbness tingling.  Denies fevers and chills.  His biggest complaint is right ankle pain and decreased range of motion.      Controlled Substances Monitoring:        Physical Exam:    No data recorded    General: Alert and 
Quality 111:Pneumonia Vaccination Status For Older Adults: Pneumococcal Vaccination Previously Received
Detail Level: Detailed
Quality 110: Preventive Care And Screening: Influenza Immunization: Influenza Immunization Administered during Influenza season

## (undated) DEVICE — HANDPIECE SET WITH BONE CLEANING TIP AND SUCTION TUBE: Brand: INTERPULSE

## (undated) DEVICE — PILLOW ABD 6X18X25IN L

## (undated) DEVICE — Device

## (undated) DEVICE — BLADE CLIPPER GEN PURP NS

## (undated) DEVICE — 1000 S-DRAPE TOWEL DRAPE 10/BX: Brand: STERI-DRAPE™

## (undated) DEVICE — TUBING SUCT 12FR MAL ALUM SHFT FN CAP VENT UNIV CONN W/ OBT

## (undated) DEVICE — ELECTRODE PT RET AD L9FT HI MOIST COND ADH HYDRGEL CORDED

## (undated) DEVICE — WASHER ORTH DIA7MM FOR CANN SCR: Type: IMPLANTABLE DEVICE | Site: PELVIS | Status: NON-FUNCTIONAL

## (undated) DEVICE — BIT DRL L195MM DIA3.5MM ST QUIK CPL NONRADIOPAQUE W/O STP

## (undated) DEVICE — CONVERTORS STOCKINETTE: Brand: CONVERTORS

## (undated) DEVICE — E-Z CLEAN, NON-STICK, PTFE COATED, ELECTROSURGICAL BLADE ELECTRODE, MODIFIED EXTENDED INSULATION, 6.5 INCH (16.5 CM): Brand: MEGADYNE

## (undated) DEVICE — BLADE,STAINLESS-STEEL,15,STRL,DISPOSABLE: Brand: MEDLINE

## (undated) DEVICE — COVER BOOT L REG BLU SMS POLYPR KNEE HI E FLD RESIST

## (undated) DEVICE — NEEDLE HYPO 18GA L1.5IN PNK POLYPR HUB S STL REG BVL STR

## (undated) DEVICE — UNIVERSAL DRAPE: Brand: MEDLINE INDUSTRIES, INC.

## (undated) DEVICE — 3M™ STERI-DRAPE™ U-DRAPE 1015: Brand: STERI-DRAPE™

## (undated) DEVICE — SOLUTION IRRIG 3000ML 0.9% SOD CHL USP UROMATIC PLAS CONT

## (undated) DEVICE — DRAPE EQUIP CARM 72X42 IN RUBBER BND CLP

## (undated) DEVICE — BIT DRL L230MM DIA2.5MM ST 3 FLUT QUIK CPL NONRADIOPAQUE

## (undated) DEVICE — 1LYRTR 16FR10ML 100%SILI SNAP: Brand: MEDLINE INDUSTRIES, INC.

## (undated) DEVICE — DRESSING PETRO W3XL8IN OIL EMUL N ADH GZ KNIT IMPREG CELOS

## (undated) DEVICE — 3M™ IOBAN™ 2 ANTIMICROBIAL INCISE DRAPE 6650EZ: Brand: IOBAN™ 2

## (undated) DEVICE — SOLUTION IRRIG 3000ML LAC RINGERS ARTHROMTC PLAS CONT

## (undated) DEVICE — WIPES SKIN CLOTH READYPREP 9 X 10.5 IN 2% CHLORHEX GLUCONATE CHG PREOP

## (undated) DEVICE — SCREW EXT FIX L175MM DIA5MM THRD L60MM S STL SELF DRL SCHNZ

## (undated) DEVICE — SYRINGE 20ML LL S/C 50

## (undated) DEVICE — GOWN,BREATHABLE SLV,AURORA,XLG,STRL: Brand: MEDLINE

## (undated) DEVICE — SHEET,DRAPE,53X77,STERILE: Brand: MEDLINE

## (undated) DEVICE — DRAPE ISOLATN PT ST W/POCKET

## (undated) DEVICE — 3M™ COBAN™ NL STERILE NON-LATEX SELF-ADHERENT WRAP, 2084S, 4 IN X 5 YD (10 CM X 4,5 M), 18 ROLLS/CASE: Brand: 3M™ COBAN™

## (undated) DEVICE — 3M™ MICROFOAM™ TAPE 1528-4: Brand: 3M™ MICROFOAM™